# Patient Record
Sex: FEMALE | Employment: FULL TIME | URBAN - METROPOLITAN AREA
[De-identification: names, ages, dates, MRNs, and addresses within clinical notes are randomized per-mention and may not be internally consistent; named-entity substitution may affect disease eponyms.]

---

## 2022-02-08 ENCOUNTER — OFFICE VISIT (OUTPATIENT)
Dept: OBGYN CLINIC | Age: 32
End: 2022-02-08

## 2022-02-08 VITALS
HEIGHT: 63 IN | SYSTOLIC BLOOD PRESSURE: 106 MMHG | BODY MASS INDEX: 25.34 KG/M2 | WEIGHT: 143 LBS | DIASTOLIC BLOOD PRESSURE: 60 MMHG

## 2022-02-08 VITALS — BODY MASS INDEX: 25.37 KG/M2 | HEIGHT: 63 IN | WEIGHT: 143.2 LBS

## 2022-02-08 DIAGNOSIS — Z12.4 ENCOUNTER FOR PAPANICOLAOU SMEAR FOR CERVICAL CANCER SCREENING: ICD-10-CM

## 2022-02-08 DIAGNOSIS — Z01.419 ENCOUNTER FOR WELL WOMAN EXAM WITH ROUTINE GYNECOLOGICAL EXAM: ICD-10-CM

## 2022-02-08 DIAGNOSIS — Z01.419 ENCOUNTER FOR WELL WOMAN EXAM WITH ROUTINE GYNECOLOGICAL EXAM: Primary | ICD-10-CM

## 2022-02-08 DIAGNOSIS — Z12.4 ENCOUNTER FOR PAPANICOLAOU SMEAR FOR CERVICAL CANCER SCREENING: Primary | ICD-10-CM

## 2022-02-08 PROCEDURE — 99385 PREV VISIT NEW AGE 18-39: CPT | Performed by: OBSTETRICS & GYNECOLOGY

## 2022-02-08 RX ORDER — NORETHINDRONE ACETATE AND ETHINYL ESTRADIOL AND FERROUS FUMARATE 1MG-20(24)
KIT ORAL
COMMUNITY
Start: 2022-01-06 | End: 2022-02-08 | Stop reason: SDUPTHER

## 2022-02-08 RX ORDER — GLUCOSAMINE/CHONDR SU A SOD 750-600 MG
TABLET ORAL
COMMUNITY

## 2022-02-08 RX ORDER — PHENAZOPYRIDINE HYDROCHLORIDE 95 MG/1
95 TABLET ORAL
COMMUNITY

## 2022-02-08 RX ORDER — TITANIUM DIOXIDE, OCTINOXATE, ZINC OXIDE 4.61; 1.6; .78 G/40ML; G/40ML; G/40ML
CREAM TOPICAL
COMMUNITY

## 2022-02-08 RX ORDER — MULTIVITAMIN
CAPSULE ORAL
COMMUNITY

## 2022-02-08 RX ORDER — NORETHINDRONE ACETATE AND ETHINYL ESTRADIOL AND FERROUS FUMARATE 1MG-20(24)
1 KIT ORAL DAILY
Qty: 3 DOSE PACK | Refills: 4 | Status: SHIPPED | OUTPATIENT
Start: 2022-02-08

## 2022-02-08 NOTE — PROGRESS NOTES
Annual exam    Paul Mahmood is a 28 y.o. presenting for annual exam. Her main concerns today include  She would like to establish care here, she just moved to [place]. She would like a refill on OCP, she would like to discuss family planning, she is planning to conceive within the next few years, she is getting  in 2023. She would like to conceive as as possible after marriage. 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 Ob/Gyn Hx:  G P A -   LMP- 23  Menarche- 13  Menses- q month, normal  Contraception- OCP  STI- none  ? SA- yes    Health maintenance:  Pap- ~ WNL per pt  Mammo- none d/t age  Colonoscopy- none  Gardasil- 3/3    History reviewed. No pertinent past medical history. History reviewed. No pertinent surgical history. Family History   Problem Relation Age of Onset    Breast Cancer Maternal Grandmother     Colon Cancer Maternal Grandfather     No Known Problems Paternal Grandfather        Social History     Socioeconomic History    Marital status: SINGLE     Spouse name: Not on file    Number of children: Not on file    Years of education: Not on file    Highest education level: Not on file   Occupational History    Not on file   Tobacco Use    Smoking status: Never Smoker    Smokeless tobacco: Never Used   Vaping Use    Vaping Use: Never used   Substance and Sexual Activity    Alcohol use:  Yes     Alcohol/week: 5.0 standard drinks     Types: 5 Glasses of wine per week    Drug use: Never    Sexual activity: Yes     Partners: Male     Birth control/protection: Pill     Comment: claude  fe   Other Topics Concern    Not on file   Social History Narrative    Not on file     Social Determinants of Health     Financial Resource Strain:     Difficulty of Paying Living Expenses: Not on file   Food Insecurity:     Worried About Running Out of Food in the Last Year: Not on file    [address] in the Last Year: Not on file   Transportation Needs:     Lack of Transportation (Medical): Not on file    Lack of Transportation (Non-Medical):  Not on file   Physical Activity:     Days of Exercise per Week: Not on file    Minutes of Exercise per Session: Not on file   Stress:     Feeling of Stress : Not on file   Social Connections:     Frequency of Communication with Friends and Family: Not on file    Frequency of Social Gatherings with Friends and Family: Not on file    Attends Mandaeism Services: Not on file    Active Member of [address] [place] [address] [employer] or Organizations: Not on file    Attends Club or Organization Meetings: Not on file    Marital Status: Not on file   Intimate Partner Violence:     Fear of Current or Ex-Partner: Not on file    Emotionally Abused: Not on file    Physically Abused: Not on file    Sexually Abused: Not on file   Housing Stability:     Unable to Pay for Housing in the Last Year: Not on file    Number of [place] in the Last Year: Not on file    Unstable Housing in the Last Year: Not on file           Allergies   Allergen Reactions    Penicillins Hives       Review of Systems - History obtained from the patient  Constitutional: negative for weight loss, fever, night sweats  HEENT: negative for hearing loss, earache, congestion, snoring, sorethroat  CV: negative for chest pain, palpitations, edema  Resp: negative for cough, shortness of breath, wheezing  GI: negative for change in bowel habits, abdominal pain, black or bloody stools  : negative for frequency, dysuria, hematuria, vaginal discharge  MSK: negative for back pain, joint pain, muscle pain  Breast: negative for breast lumps, nipple discharge, galactorrhea  Skin :negative for itching, rash, hives  Neuro: negative for dizziness, headache, confusion, weakness  Psych: negative for anxiety, depression, change in mood  Heme/lymph: negative for bleeding, bruising, pallor    Physical Exam    Visit Vitals  Ht 5' 3\" (1.6 m)   Wt 143 lb 3.2 oz (65 kg)   LMP 02/07/2022   BMI 25.37 kg/m²       Constitutional  · Appearance: well-nourished, well developed, alert, in no acute distress    HENT  · Head and Face: appears normal    Neck  · Inspection/Palpation: normal appearance, no masses or tenderness  · Lymph Nodes: no lymphadenopathy present  · Thyroid: gland size normal, nontender, no nodules or masses present on palpation    Chest  · Respiratory Effort: non-labored breathing  · Auscultation: CTAB, normal breath sounds    Cardiovascular  · Heart:  · Auscultation: regular rate and rhythm without murmur  · Extremities: no peripheral edema    Breasts  · Inspection of Breasts: breasts symmetrical, no skin changes, no discharge present, nipple appearance normal, no skin retraction present  · Palpation of Breasts and Axillae: no masses present on palpation, no breast tenderness  · Axillary Lymph Nodes: no lymphadenopathy present    Gastrointestinal  · Abdominal Examination: abdomen non-tender to palpation, normal bowel sounds, no masses present  · Liver and spleen: no hepatomegaly present, spleen not palpable  · Hernias: no hernias identified    Genitourinary  · External Genitalia: normal appearance for age, no discharge present, no tenderness present, no inflammatory lesions present, no masses present, no atrophy present  · Vagina: normal vaginal vault without central or paravaginal defects, no discharge present, no inflammatory lesions present, no masses present, menstrual blood present  · Bladder: non-tender to palpation  · Urethra: appears normal  · Cervix: normal   · Uterus: normal size, shape and consistency  · Adnexa: no adnexal tenderness present, no adnexal masses present  · Perineum: perineum within normal limits, no evidence of trauma, no rashes or skin lesions present    Skin  · General Inspection: no rash, no lesions identified    Neurologic/Psychiatric  · Mental Status:  · Orientation: grossly oriented to person, place and time  · Mood and Affect: mood normal, affect appropriate      Assessment/Plan:  31 y. o.presenting for annual exam. Overall doing well. Normal gynecologic and breast exams. Healthy habits and lifestyle reviewed. Pap with HPV performed today. Patient declines STD screening. Contraception and menstrual regulation - patient opts for cont OCPs for now. Rx sent. Discussed timing of stopping her OCPs 3-4 months before planned conception and starting a prenatal vitamin around the same time.      Ana Alva MD

## 2022-02-08 NOTE — PATIENT INSTRUCTIONS
Well Visit, Ages 25 to 48: Care Instructions  Overview     Well visits can help you stay healthy. Your doctor has checked your overall health and may have suggested ways to take good care of yourself. Your doctor also may have recommended tests. At home, you can help prevent illness with healthy eating, regular exercise, and other steps. Follow-up care is a key part of your treatment and safety. Be sure to make and go to all appointments, and call your doctor if you are having problems. It's also a good idea to know your test results and keep a list of the medicines you take. How can you care for yourself at home? · Get screening tests that you and your doctor decide on. Screening helps find diseases before any symptoms appear. · Eat healthy foods. Choose fruits, vegetables, whole grains, protein, and low-fat dairy foods. Limit fat, especially saturated fat. Reduce salt in your diet. · Limit alcohol. If you are a man, have no more than 2 drinks a day or 14 drinks a week. If you are a woman, have no more than 1 drink a day or 7 drinks a week. · Get at least 30 minutes of physical activity on most days of the week. Walking is a good choice. You also may want to do other activities, such as running, swimming, cycling, or playing tennis or team sports. Discuss any changes in your exercise program with your doctor. · Reach and stay at a healthy weight. This will lower your risk for many problems, such as obesity, diabetes, heart disease, and high blood pressure. · Do not smoke or allow others to smoke around you. If you need help quitting, talk to your doctor about stop-smoking programs and medicines. These can increase your chances of quitting for good. · Care for your mental health. It is easy to get weighed down by worry and stress. Learn strategies to manage stress, like deep breathing and mindfulness, and stay connected with your family and community.  If you find you often feel sad or hopeless, talk with your doctor. Treatment can help. · Talk to your doctor about whether you have any risk factors for sexually transmitted infections (STIs). You can help prevent STIs if you wait to have sex with a new partner (or partners) until you've each been tested for STIs. It also helps if you use condoms (male or female condoms) and if you limit your sex partners to one person who only has sex with you. Vaccines are available for some STIs, such as HPV. · Use birth control if it's important to you to prevent pregnancy. Talk with your doctor about the choices available and what might be best for you. · If you think you may have a problem with alcohol or drug use, talk to your doctor. This includes prescription medicines (such as amphetamines and opioids) and illegal drugs (such as cocaine and methamphetamine). Your doctor can help you figure out what type of treatment is best for you. · Protect your skin from too much sun. When you're outdoors from 10 a.m. to 4 p.m., stay in the shade or cover up with clothing and a hat with a wide brim. Wear sunglasses that block UV rays. Even when it's cloudy, put broad-spectrum sunscreen (SPF 30 or higher) on any exposed skin. · See a dentist one or two times a year for checkups and to have your teeth cleaned. · Wear a seat belt in the car. When should you call for help? Watch closely for changes in your health, and be sure to contact your doctor if you have any problems or symptoms that concern you. Where can you learn more? Go to http://abby-colten.info/  Enter P072 in the search box to learn more about \"Well Visit, Ages 25 to 48: Care Instructions. \"  Current as of: February 11, 2021               Content Version: 13.0  © 4409-2802 Healthwise, Incorporated. Care instructions adapted under license by [employer] (which disclaims liability or warranty for this information).  If you have questions about a medical condition or this instruction, always ask your healthcare professional. [place] [address] any warranty or liability for your use of this information.

## 2022-02-15 ENCOUNTER — TELEPHONE (OUTPATIENT)
Dept: OBGYN CLINIC | Age: 32
End: 2022-02-15

## 2022-02-15 LAB
CYTOLOGIST CVX/VAG CYTO: NORMAL
CYTOLOGY CVX/VAG DOC CYTO: NORMAL
CYTOLOGY CVX/VAG DOC THIN PREP: NORMAL
DX ICD CODE: NORMAL
HPV I/H RISK 4 DNA CVX QL PROBE+SIG AMP: NEGATIVE
Lab: NORMAL
Lab: NORMAL
OTHER STN SPEC: NORMAL
STAT OF ADQ CVX/VAG CYTO-IMP: NORMAL

## 2022-02-15 NOTE — TELEPHONE ENCOUNTER
Call received at 11:25am      28year old patient last seen in the office on 2/8/2022. Patient calling back about her recent lab work    Patient was advised of MD reviewed lab results and verbalized understanding.    Ezio Shaw MD   2/15/2022 10:13 AM EST         Please notify patient that her results of her pap and HPV test are normal.

## 2022-04-20 NOTE — PROGRESS NOTES
HPI: Paul Altamirano (: 1990) is a 28 y.o. female, patient, here for evaluation of the following chief complaint(s):    Hand Pain (Right hand numbness, tingling, soreness starting 6 weeks ago. H/o carpal tunnel syndrome, has received steroid injections twice, which helped for a while.)  Patient is seen today to evaluate her right wrist and hand. She has a history of right wrist carpal tunnel syndrome and has had 2 prior corticosteroid injections. Each time the injections have resolved her paresthesias but slowly with time her pain and paresthesias have recurred. She now complains of more paresthesias on the right side that extend proximally towards the shoulder but she otherwise demonstrates good range of motion without digital clicking or locking. Vitals:  Ht 5' 3\" (1.6 m)   Wt 135 lb (61.2 kg)   BMI 23.91 kg/m²    Body mass index is 23.91 kg/m². Allergies   Allergen Reactions    Penicillins Hives       Current Outpatient Medications   Medication Sig    VITAMIN B COMPLEX PO Take 1 Tablet by mouth daily.  multivitamin capsule Take  by mouth.  cranberry 400 mg cap Take  by mouth.  Biotin 2,500 mcg cap Take  by mouth.  Eva 24 Fe 1 mg-20 mcg(24) /75 mg (4) chew Take 1 Tablet by mouth daily.  phenazopyridine (PYRIDIUM) 95 mg tab Take 95 mg by mouth three (3) times daily as needed. (Patient not taking: Reported on 2022)     No current facility-administered medications for this visit. History reviewed. No pertinent past medical history. History reviewed. No pertinent surgical history. Family History   Problem Relation Age of Onset    Breast Cancer Maternal Grandmother     Colon Cancer Maternal Grandfather     No Known Problems Paternal Grandfather         Social History     Tobacco Use    Smoking status: Never Smoker    Smokeless tobacco: Never Used   Vaping Use    Vaping Use: Never used   Substance Use Topics    Alcohol use:  Yes     Alcohol/week: 5.0 standard drinks     Types: 5 Glasses of wine per week    Drug use: Never        Review of Systems   All other systems reviewed and are negative. ROS     Positive for: Musculoskeletal    Last edited by Bia Thapa on 4/21/2022  9:16 AM. (History)             Physical Exam    Both elbows, forearms, wrist and hands demonstrate good range of motion with no digital locking. She has a positive Tinel's more than Phalen's and nerve compression test on the right side producing paresthesias in the median nerve distribution. She has good intrinsic and thenar muscle strength. No cyst or mass. Imaging:    XR Results (most recent):  No results found for this or any previous visit. ASSESSMENT/PLAN:  Below is the assessment and plan developed based on review of pertinent history, physical exam, labs, studies, and medications. Patient examination has remained consistent with right wrist carpal tunnel syndrome. She has had 2 prior successful carpal tunnel injections that of abated her symptoms but slowly with time her paresthesias have returned. She was offered but deferred 1/3 injection and prefers instead to proceed with a right wrist endoscopic carpal tunnel release. I reviewed risks that include but not limited to stiffness, pain, nerve or tendon damage and incomplete relief of pain and paresthesias. Arrangements can be made for this to be performed on an outpatient basis soon as possible. 1. Right hand pain  2. Carpal tunnel syndrome of right wrist      Return for Follow-up 7 to 10 days after surgery. .    An electronic signature was used to authenticate this note.   -- Renata Shaikh MD

## 2022-04-21 ENCOUNTER — OFFICE VISIT (OUTPATIENT)
Dept: ORTHOPEDIC SURGERY | Age: 32
End: 2022-04-21
Payer: COMMERCIAL

## 2022-04-21 VITALS — HEIGHT: 63 IN | WEIGHT: 135 LBS | BODY MASS INDEX: 23.92 KG/M2

## 2022-04-21 DIAGNOSIS — M79.641 RIGHT HAND PAIN: Primary | ICD-10-CM

## 2022-04-21 DIAGNOSIS — G56.01 CARPAL TUNNEL SYNDROME OF RIGHT WRIST: ICD-10-CM

## 2022-04-21 DIAGNOSIS — G56.01 CARPAL TUNNEL SYNDROME OF RIGHT WRIST: Primary | ICD-10-CM

## 2022-04-21 PROCEDURE — 99213 OFFICE O/P EST LOW 20 MIN: CPT | Performed by: ORTHOPAEDIC SURGERY

## 2022-05-25 DIAGNOSIS — G56.01 CARPAL TUNNEL SYNDROME OF RIGHT WRIST: Primary | ICD-10-CM

## 2022-05-25 RX ORDER — HYDROCODONE BITARTRATE AND ACETAMINOPHEN 5; 325 MG/1; MG/1
1 TABLET ORAL
Qty: 15 TABLET | Refills: 0 | Status: SHIPPED | OUTPATIENT
Start: 2022-05-25 | End: 2022-05-28

## 2022-06-01 NOTE — PROGRESS NOTES
HPI: Paul Altamirano (: 1990) is a 28 y.o. female, patient, here for evaluation of the following chief complaint(s):    No chief complaint on file. Patient is seen today to evaluate her right wrist and hand. She has a history of right wrist carpal tunnel syndrome and has had 2 prior corticosteroid injections. Each time the injections have resolved her paresthesias but slowly with time her pain and paresthesias have recurred. She now complains of more paresthesias on the right side that extend proximally towards the shoulder but she otherwise demonstrates good range of motion without digital clicking or locking. She underwent a right endoscopic carpal tunnel release on 2022. Vitals: There were no vitals taken for this visit. There is no height or weight on file to calculate BMI. Allergies   Allergen Reactions    Penicillins Hives       Current Outpatient Medications   Medication Sig    VITAMIN B COMPLEX PO Take 1 Tablet by mouth daily.  multivitamin capsule Take  by mouth.  phenazopyridine (PYRIDIUM) 95 mg tab Take 95 mg by mouth three (3) times daily as needed. (Patient not taking: Reported on 2022)    cranberry 400 mg cap Take  by mouth.  Biotin 2,500 mcg cap Take  by mouth.  Eva 24 Fe 1 mg-20 mcg(24) /75 mg (4) chew Take 1 Tablet by mouth daily. No current facility-administered medications for this visit. No past medical history on file. No past surgical history on file. Family History   Problem Relation Age of Onset    Breast Cancer Maternal Grandmother     Colon Cancer Maternal Grandfather     No Known Problems Paternal Grandfather         Social History     Tobacco Use    Smoking status: Never Smoker    Smokeless tobacco: Never Used   Vaping Use    Vaping Use: Never used   Substance Use Topics    Alcohol use:  Yes     Alcohol/week: 5.0 standard drinks     Types: 5 Glasses of wine per week    Drug use: Never        Review of Systems   All other systems reviewed and are negative. Physical Exam    Right carpal tunnel wound is healing well. No redness drainage or sign of infection. Sutures are self absorbing. He has some resolving ecchymosis but no sign at all of any abnormality or hematoma. Imaging:    XR Results (most recent):  No results found for this or any previous visit. ASSESSMENT/PLAN:  Below is the assessment and plan developed based on review of pertinent history, physical exam, labs, studies, and medications. Patient examination has remained consistent with right wrist carpal tunnel syndrome. She has had 2 prior successful carpal tunnel injections that of abated her symptoms but slowly with time her paresthesias have returned. She was offered but deferred 1/3 injection and prefers instead to proceed with a right wrist endoscopic carpal tunnel release. She indeed underwent a right endoscopic carpal tunnel release on 5/26/2022. Continue with simple wound care, gentle motion and strength. No current concerns with left wrist.  Follow-up in 3 to 4 weeks. 1. Carpal tunnel syndrome of right wrist  2. Right hand pain  3. Status post carpal tunnel release      No follow-ups on file. An electronic signature was used to authenticate this note.   -- Kirti Catrer MD

## 2022-06-02 ENCOUNTER — OFFICE VISIT (OUTPATIENT)
Dept: ORTHOPEDIC SURGERY | Age: 32
End: 2022-06-02
Payer: COMMERCIAL

## 2022-06-02 DIAGNOSIS — M79.641 RIGHT HAND PAIN: ICD-10-CM

## 2022-06-02 DIAGNOSIS — Z98.890 STATUS POST CARPAL TUNNEL RELEASE: ICD-10-CM

## 2022-06-02 DIAGNOSIS — G56.01 CARPAL TUNNEL SYNDROME OF RIGHT WRIST: Primary | ICD-10-CM

## 2022-06-02 PROCEDURE — 99024 POSTOP FOLLOW-UP VISIT: CPT | Performed by: ORTHOPAEDIC SURGERY

## 2022-06-02 NOTE — PATIENT INSTRUCTIONS
Wrist: Exercises  Introduction  Here are some examples of exercises for you to try. The exercises may be suggested for a condition or for rehabilitation. Start each exercise slowly. Ease off the exercises if you start to have pain. You will be told when to start these exercises and which ones will work best for you. How to do the exercises  Prayer stretch    1. Start with your palms together in front of your chest just below your chin. 2. Slowly lower your hands toward your waistline, keeping your hands close to your stomach and your palms together until you feel a mild to moderate stretch under your forearms. 3. Hold for at least 15 to 30 seconds. Repeat 2 to 4 times. Wrist flexor stretch    1. Extend your arm in front of you with your palm up. 2. Bend your wrist, pointing your hand toward the floor. 3. With your other hand, gently bend your wrist farther until you feel a mild to moderate stretch in your forearm. 4. Hold for at least 15 to 30 seconds. Repeat 2 to 4 times. Wrist extensor stretch    1. Repeat steps 1 through 4 of the stretch above, but begin with your extended hand palm down. Follow-up care is a key part of your treatment and safety. Be sure to make and go to all appointments, and call your doctor if you are having problems. It's also a good idea to know your test results and keep a list of the medicines you take. Where can you learn more? Go to http://www.gray.com/  Enter Z598 in the search box to learn more about \"Wrist: Exercises. \"  Current as of: July 1, 2021               Content Version: 13.2  © 2006-2022 Healthwise, Incorporated. Care instructions adapted under license by [employer] (which disclaims liability or warranty for this information).  If you have questions about a medical condition or this instruction, always ask your healthcare professional. [place] [address] any warranty or liability for your use of this information.

## 2022-06-28 NOTE — PROGRESS NOTES
HPI: Paul Altamirano (: 1990) is a 28 y.o. female, patient, here for evaluation of the following chief complaint(s):    No chief complaint on file. Patient is seen today to evaluate her right wrist and hand. She has a history of right wrist carpal tunnel syndrome and has had 2 prior corticosteroid injections. Each time the injections have resolved her paresthesias but slowly with time her pain and paresthesias have recurred. She now complains of more paresthesias on the right side that extend proximally towards the shoulder but she otherwise demonstrates good range of motion without digital clicking or locking. She underwent a right endoscopic carpal tunnel release on 2022. Vitals: There were no vitals taken for this visit. There is no height or weight on file to calculate BMI. Allergies   Allergen Reactions    Penicillins Hives       Current Outpatient Medications   Medication Sig    VITAMIN B COMPLEX PO Take 1 Tablet by mouth daily.  multivitamin capsule Take  by mouth.  phenazopyridine (PYRIDIUM) 95 mg tab Take 95 mg by mouth three (3) times daily as needed. (Patient not taking: Reported on 2022)    cranberry 400 mg cap Take  by mouth.  Biotin 2,500 mcg cap Take  by mouth.  Eva 24 Fe 1 mg-20 mcg(24) /75 mg (4) chew Take 1 Tablet by mouth daily. No current facility-administered medications for this visit. No past medical history on file. No past surgical history on file. Family History   Problem Relation Age of Onset    Breast Cancer Maternal Grandmother     Colon Cancer Maternal Grandfather     No Known Problems Paternal Grandfather         Social History     Tobacco Use    Smoking status: Never Smoker    Smokeless tobacco: Never Used   Vaping Use    Vaping Use: Never used   Substance Use Topics    Alcohol use:  Yes     Alcohol/week: 5.0 standard drinks     Types: 5 Glasses of wine per week    Drug use: Never        Review of Systems   All other systems reviewed and are negative. Physical Exam    Right carpal tunnel wound is well-healed. No redness drainage or sign of infection. Prior ecchymosis is fully resolved. She does have slight soreness more to the base of the thenar muscle than the hypothenar side but does not have true pillar pain. Imaging:    XR Results (most recent):  No results found for this or any previous visit. ASSESSMENT/PLAN:  Below is the assessment and plan developed based on review of pertinent history, physical exam, labs, studies, and medications. Patient examination has remained consistent with right wrist carpal tunnel syndrome. She has had 2 prior successful carpal tunnel injections that of abated her symptoms but slowly with time her paresthesias have returned. She was offered but deferred 1/3 injection and prefers instead to proceed with a right wrist endoscopic carpal tunnel release. She indeed underwent a right endoscopic carpal tunnel release on 5/26/2022. Overall she is doing well but did does have some mild tenderness to the radial base of the thenar muscle but not on the hypothenar side and no real evidence of pillar pain. I recommended massage to the region with continued motion and strengthening. Her sensation is restored and she is pleased with the outcome thus far. She deferred the need for outpatient therapy and may continue with home exercises. She may return anytime for further treatment. 1. Carpal tunnel syndrome of right wrist  2. Right hand pain  3. Status post carpal tunnel release      No follow-ups on file. An electronic signature was used to authenticate this note.   -- Genesis Castro MD

## 2022-06-30 ENCOUNTER — OFFICE VISIT (OUTPATIENT)
Dept: ORTHOPEDIC SURGERY | Age: 32
End: 2022-06-30
Payer: COMMERCIAL

## 2022-06-30 DIAGNOSIS — G56.01 CARPAL TUNNEL SYNDROME OF RIGHT WRIST: Primary | ICD-10-CM

## 2022-06-30 DIAGNOSIS — Z98.890 STATUS POST CARPAL TUNNEL RELEASE: ICD-10-CM

## 2022-06-30 DIAGNOSIS — M79.641 RIGHT HAND PAIN: ICD-10-CM

## 2022-06-30 PROCEDURE — 99024 POSTOP FOLLOW-UP VISIT: CPT | Performed by: ORTHOPAEDIC SURGERY

## 2022-06-30 NOTE — PATIENT INSTRUCTIONS
Wrist: Exercises  Introduction  Here are some examples of exercises for you to try. The exercises may be suggested for a condition or for rehabilitation. Start each exercise slowly. Ease off the exercises if you start to have pain. You will be told when to start these exercises and which ones will work best for you. How to do the exercises  Prayer stretch    1. Start with your palms together in front of your chest just below your chin. 2. Slowly lower your hands toward your waistline, keeping your hands close to your stomach and your palms together until you feel a mild to moderate stretch under your forearms. 3. Hold for at least 15 to 30 seconds. Repeat 2 to 4 times. Wrist flexor stretch    1. Extend your arm in front of you with your palm up. 2. Bend your wrist, pointing your hand toward the floor. 3. With your other hand, gently bend your wrist farther until you feel a mild to moderate stretch in your forearm. 4. Hold for at least 15 to 30 seconds. Repeat 2 to 4 times. Wrist extensor stretch    1. Repeat steps 1 through 4 of the stretch above, but begin with your extended hand palm down. Follow-up care is a key part of your treatment and safety. Be sure to make and go to all appointments, and call your doctor if you are having problems. It's also a good idea to know your test results and keep a list of the medicines you take. Where can you learn more? Go to http://www.gray.com/  Enter Z598 in the search box to learn more about \"Wrist: Exercises. \"  Current as of: July 1, 2021               Content Version: 13.2  © 2006-2022 Healthwise, Incorporated. Care instructions adapted under license by [employer] (which disclaims liability or warranty for this information).  If you have questions about a medical condition or this instruction, always ask your healthcare professional. [place] [address] any warranty or liability for your use of this information.

## 2023-01-04 ENCOUNTER — OFFICE VISIT (OUTPATIENT)
Dept: INTERNAL MEDICINE CLINIC | Age: 33
End: 2023-01-04
Payer: COMMERCIAL

## 2023-01-04 VITALS
HEART RATE: 71 BPM | DIASTOLIC BLOOD PRESSURE: 66 MMHG | OXYGEN SATURATION: 100 % | SYSTOLIC BLOOD PRESSURE: 99 MMHG | RESPIRATION RATE: 16 BRPM | HEIGHT: 63 IN | BODY MASS INDEX: 23.12 KG/M2 | WEIGHT: 130.5 LBS | TEMPERATURE: 98.9 F

## 2023-01-04 DIAGNOSIS — Z11.59 NEED FOR HEPATITIS C SCREENING TEST: ICD-10-CM

## 2023-01-04 DIAGNOSIS — Z00.00 WELL WOMAN EXAM (NO GYNECOLOGICAL EXAM): Primary | ICD-10-CM

## 2023-01-04 DIAGNOSIS — Z23 ENCOUNTER FOR IMMUNIZATION: ICD-10-CM

## 2023-01-04 PROCEDURE — 99385 PREV VISIT NEW AGE 18-39: CPT | Performed by: FAMILY MEDICINE

## 2023-01-04 PROCEDURE — 90686 IIV4 VACC NO PRSV 0.5 ML IM: CPT | Performed by: FAMILY MEDICINE

## 2023-01-04 PROCEDURE — 90471 IMMUNIZATION ADMIN: CPT | Performed by: FAMILY MEDICINE

## 2023-01-04 NOTE — PROGRESS NOTES
Chief Complaint   Patient presents with    Complete Physical     she is a 33y.o. year old female who presents for CPE  Complete Physical Exam Questions:    LMP -  12/21/2022  Last Pap (q 3 years, or q5 with HPV) - 2/22  Last Mammogram (50-74 biennially)- n/a  Hx of abnl Pap - No    1. Do you follow a low fat diet?  no  2. Are you up to date on your Tdap (<10 years)? Yes  3. Have you ever had a Pneumovax vaccine (>65)? Not applicable   ZZZ24 Not applicable   YIFV42 Not applicable  4. Have you had Zoster vaccine (>60)? Not applicable  5. Have you had the HPV - Gardasil (13- 26)? Not applicable  6. Do you follow an exercise program?  yes  7. Do you smoke?  no  8. Do you consider yourself overweight?  no  9. Is there a family history of CAD< age 48? No  10. Is there a family history of Cancer?  yes  11. Do you know your Cancer risks? No  12. Have you had a colonoscopy?  no  13. Have you been tested for HIV or other STI's? No HIV testing today(18-66 y/o)? No  14. Have had a bone density scan or DEXA done(>65)? No  15. Have you had an EKG performed in the last five years (>50)? No    Other complaints:none    Reviewed and agree with Nurse Note and duplicated in this note. Reviewed PmHx, RxHx, FmHx, SocHx, AllgHx and updated and dated in the chart. Family History   Problem Relation Age of Onset    Breast Cancer Maternal Grandmother     Colon Cancer Maternal Grandfather     No Known Problems Paternal Grandfather      No past medical history on file. Social History     Socioeconomic History    Marital status: SINGLE   Tobacco Use    Smoking status: Never    Smokeless tobacco: Never   Vaping Use    Vaping Use: Never used   Substance and Sexual Activity    Alcohol use:  Yes     Alcohol/week: 5.0 standard drinks     Types: 5 Glasses of wine per week    Drug use: Never    Sexual activity: Yes     Partners: Male     Birth control/protection: Pill     Comment: claude 24 fe        Review of Systems - negative except as listed above      Objective:     Vitals:    01/04/23 1330   BP: 99/66   Pulse: 71   Resp: 16   Temp: 98.9 °F (37.2 °C)   SpO2: 100%   Weight: 130 lb 8 oz (59.2 kg)   Height: 5' 3\" (1.6 m)       Physical Examination: General appearance - alert, well appearing, and in no distress  Eyes - pupils equal and reactive, extraocular eye movements intact  Ears - bilateral TM's and external ear canals normal  Nose - normal and patent, no erythema, discharge or polyps  Mouth - mucous membranes moist, pharynx normal without lesions  Neck - supple, no significant adenopathy  Chest - clear to auscultation, no wheezes, rales or rhonchi, symmetric air entry  Heart - normal rate, regular rhythm, normal S1, S2, no murmurs, rubs, clicks or gallops  Abdomen - soft, nontender, nondistended, no masses or organomegaly  Back exam - full range of motion, no tenderness, palpable spasm or pain on motion  Neurological - alert, oriented, normal speech, no focal findings or movement disorder noted  Musculoskeletal - no joint tenderness, deformity or swelling  Extremities - peripheral pulses normal, no pedal edema, no clubbing or cyanosis  Skin - normal coloration and turgor, no rashes, no suspicious skin lesions noted      Assessment/ Plan:   Diagnoses and all orders for this visit:    1. Well woman exam (no gynecological exam)  -     CBC W/O DIFF; Future  -     LIPID PANEL; Future  -     METABOLIC PANEL, COMPREHENSIVE; Future    2. Need for hepatitis C screening test  -     HEPATITIS C AB; Future         Labs to be drawn: CBC, CMP, Lipid            I have discussed the diagnosis with the patient and the intended plan as seen in the above orders. The patient has received an after-visit summary and questions were answered concerning future plans.    Medication Side Effects and Warnings were discussed with patient,  Patient Labs were reviewed and or requested, and  Patient Past Records were reviewed and or requested  yes Pt agrees to call or return to clinic and/or go to closest ER with any worsening of symptoms. This may include, but not limited to increased fever (>100.4) with NSAIDS or Tylenol, increased edema, confusion, rash, worsening of presenting symptoms. Please note that this dictation was completed with [employer], the computer voice recognition software. Quite often unanticipated grammatical, syntax, homophones, and other interpretive errors are inadvertently transcribed by the computer software. Please disregard these errors. Please excuse any errors that have escaped final proofreading. Thank you.

## 2023-01-05 LAB
ALBUMIN SERPL-MCNC: 4.3 G/DL (ref 3.8–4.8)
ALBUMIN/GLOB SERPL: 1.5 {RATIO} (ref 1.2–2.2)
ALP SERPL-CCNC: 39 IU/L (ref 44–121)
ALT SERPL-CCNC: 11 IU/L (ref 0–32)
AST SERPL-CCNC: 15 IU/L (ref 0–40)
BILIRUB SERPL-MCNC: 0.5 MG/DL (ref 0–1.2)
BUN SERPL-MCNC: 15 MG/DL (ref 6–20)
BUN/CREAT SERPL: 25 (ref 9–23)
CALCIUM SERPL-MCNC: 8.9 MG/DL (ref 8.7–10.2)
CHLORIDE SERPL-SCNC: 104 MMOL/L (ref 96–106)
CHOLEST SERPL-MCNC: 209 MG/DL (ref 100–199)
CO2 SERPL-SCNC: 22 MMOL/L (ref 20–29)
CREAT SERPL-MCNC: 0.61 MG/DL (ref 0.57–1)
EGFR: 122 ML/MIN/1.73
ERYTHROCYTE [DISTWIDTH] IN BLOOD BY AUTOMATED COUNT: 12 % (ref 11.7–15.4)
GLOBULIN SER CALC-MCNC: 2.8 G/DL (ref 1.5–4.5)
GLUCOSE SERPL-MCNC: 82 MG/DL (ref 70–99)
HCT VFR BLD AUTO: 38.7 % (ref 34–46.6)
HCV AB S/CO SERPL IA: <0.1 S/CO RATIO (ref 0–0.9)
HDLC SERPL-MCNC: 69 MG/DL
HGB BLD-MCNC: 12.7 G/DL (ref 11.1–15.9)
LDLC SERPL CALC-MCNC: 124 MG/DL (ref 0–99)
MCH RBC QN AUTO: 30.5 PG (ref 26.6–33)
MCHC RBC AUTO-ENTMCNC: 32.8 G/DL (ref 31.5–35.7)
MCV RBC AUTO: 93 FL (ref 79–97)
PLATELET # BLD AUTO: 323 X10E3/UL (ref 150–450)
POTASSIUM SERPL-SCNC: 4.6 MMOL/L (ref 3.5–5.2)
PROT SERPL-MCNC: 7.1 G/DL (ref 6–8.5)
RBC # BLD AUTO: 4.16 X10E6/UL (ref 3.77–5.28)
SODIUM SERPL-SCNC: 139 MMOL/L (ref 134–144)
TRIGL SERPL-MCNC: 89 MG/DL (ref 0–149)
VLDLC SERPL CALC-MCNC: 16 MG/DL (ref 5–40)
WBC # BLD AUTO: 8.8 X10E3/UL (ref 3.4–10.8)

## 2023-01-05 NOTE — PROGRESS NOTES
Your \"Bad\" cholesterol (LDL and/or triglycerides) are elevated. Please eat a healthier diet as described below. In particular avoid fried, fatty and junk foods, while increasing fiber (fruits and vegetables). If you cannot increase fiber through diet, you can supplement with metamucil as directed on bottle daily. Also, make sure you are taking 1 to 2 grams of over the counter fish oil. Increase exercise to 5 times per week of cardio lasting at least 30 min's each (biking, walking, elliptical, swimming). Lets recheck the fasting (atleast eight hours) in 6 months. Mediterranean diet: Choose this heart-healthy diet option  The Mediterranean diet is a heart-healthy eating plan combining elements of Mediterranean-style cooking. Here's how to adopt the Mediterranean diet. If you're looking for a heart-healthy eating plan, the Mediterranean diet might be right for you. The Mediterranean diet incorporates the basics of healthy eating - plus a splash of flavorful olive oil and perhaps a glass of red wine - among other components characterizing the traditional cooking style of countries bordering the [facility]. Most healthy diets include fruits, vegetables, fish and whole grains, and limit unhealthy fats. While these parts of a healthy diet remain tried-and-true, subtle variations or differences in proportions of certain foods may make a difference in your risk of heart disease. Benefits of the [address] has shown that the traditional Mediterranean diet reduces the risk of heart disease. In fact, a recent analysis of more than 1.5 million healthy adults demonstrated that following a Mediterranean diet was associated with a reduced risk of overall and cardiovascular mortality, a reduced incidence of cancer and cancer mortality, and a reduced incidence of Parkinson's and Alzheimer's diseases.    For this reason, most if not all major scientific organizations encourage healthy adults to adapt a style of eating like that of the [address] [facility] for prevention of major chronic diseases. Key components of the Mediterranean diet  The Mediterranean diet emphasizes:   Getting plenty of exercise   Eating primarily plant-based foods, such as fruits and vegetables, whole grains, legumes and nuts   Replacing butter with healthy fats such as olive oil and canola oil   Using herbs and spices instead of salt to flavor foods   Limiting red meat to no more than a few times a month   Eating fish and poultry at least twice a week   Drinking red wine in moderation (optional)   The diet also recognizes the importance of enjoying meals with family and friends. Fruits, vegetables, nuts and grains  The Mediterranean diet traditionally includes fruits, vegetables, pasta and rice. For example, residents of [facility] eat very little red meat and average nine servings a day of antioxidant-rich fruits and vegetables. The Mediterranean diet has been associated with a lower level of oxidized low-density lipoprotein (LDL) cholesterol - the \"bad\" cholesterol that's more likely to build up deposits in your arteries. Nuts are another part of a healthy Mediterranean diet. Nuts are high in fat (approximately 80 percent of their calories come from fat), but most of the fat is not saturated. Because nuts are high in calories, they should not be eaten in large amounts - generally no more than a handful a day. For the best nutrition, avoid candied or honey-roasted and heavily salted nuts. Grains in the [address] [place] [address] region are typically whole grain and usually contain very few unhealthy trans fats, and bread is an important part of the diet there. However, throughout the [address] [place] [address] region, bread is eaten plain or dipped in olive oil - not eaten with butter or margarines, which contain saturated or trans fats.

## 2023-03-09 ENCOUNTER — OFFICE VISIT (OUTPATIENT)
Dept: OBGYN CLINIC | Age: 33
End: 2023-03-09
Payer: COMMERCIAL

## 2023-03-09 VITALS
DIASTOLIC BLOOD PRESSURE: 58 MMHG | SYSTOLIC BLOOD PRESSURE: 88 MMHG | BODY MASS INDEX: 22.15 KG/M2 | HEIGHT: 63 IN | WEIGHT: 125 LBS

## 2023-03-09 DIAGNOSIS — Z01.419 ENCOUNTER FOR WELL WOMAN EXAM WITH ROUTINE GYNECOLOGICAL EXAM: Primary | ICD-10-CM

## 2023-03-09 PROCEDURE — 99395 PREV VISIT EST AGE 18-39: CPT | Performed by: OBSTETRICS & GYNECOLOGY

## 2023-03-09 RX ORDER — ADAPALENE AND BENZOYL PEROXIDE 3; 25 MG/G; MG/G
GEL TOPICAL
COMMUNITY
Start: 2023-01-25

## 2023-03-09 RX ORDER — NORETHINDRONE ACETATE, ETHINYL ESTRADIOL AND FERROUS FUMARATE 1MG-20(24)
1 KIT ORAL DAILY
Qty: 3 DOSE PACK | Refills: 3 | Status: SHIPPED | OUTPATIENT
Start: 2023-03-09

## 2023-03-09 NOTE — PROGRESS NOTES
Annual exam    Paul Garza is a 28 y.o. presenting for annual exam. Her main concerns today include wellness screening. She would also like to discuss family planning, she is planning to conceive within the next few months, she is getting  in April! Wedding in [place], 2 week honeymoon [place]! She would like to conceive shortly after marriage. 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 Ob/Gyn Hx:  G0  LMP-   Menses- regular, light and 3 days  Contraception- OCP  STI- accepts pelvic screening  SA- yes, fiance    Health maintenance:  Pap- 2/8/22 NIL, HPV neg  Gardasil- 3/3    History reviewed. No pertinent past medical history. History reviewed. No pertinent surgical history. Family History   Problem Relation Age of Onset    Breast Cancer Maternal Grandmother     Colon Cancer Maternal Grandfather     No Known Problems Paternal Grandfather        Social History     Socioeconomic History    Marital status: SINGLE     Spouse name: Not on file    Number of children: Not on file    Years of education: Not on file    Highest education level: Not on file   Occupational History    Not on file   Tobacco Use    Smoking status: Never Smoker    Smokeless tobacco: Never Used   Vaping Use    Vaping Use: Never used   Substance and Sexual Activity    Alcohol use: Yes     Alcohol/week: 5.0 standard drinks     Types: 5 Glasses of wine per week    Drug use: Never    Sexual activity: Yes     Partners: Male     Birth control/protection: Pill     Comment: claude 24 fe   Other Topics Concern    Not on file   Social History Narrative    Not on file     Social Determinants of Health     Financial Resource Strain:     Difficulty of Paying Living Expenses: Not on file   Food Insecurity:     Worried About [address] in the Last Year: Not on file    [place] of Food in the Last Year: Not on file   Transportation Needs:     Lack of Transportation (Medical):  Not on file    Lack of Transportation (Non-Medical):  Not on file   Physical Activity:     Days of Exercise per Week: Not on file    Minutes of Exercise per Session: Not on file   Stress:     Feeling of Stress : Not on file   Social Connections:     Frequency of Communication with Friends and Family: Not on file    Frequency of Social Gatherings with Friends and Family: Not on file    Attends Mosque Services: Not on file    Active Member of Clubs or Organizations: Not on file    Attends Club or Organization Meetings: Not on file    Marital Status: Not on file   Intimate Partner Violence:     Fear of Current or Ex-Partner: Not on file    Emotionally Abused: Not on file    Physically Abused: Not on file    Sexually Abused: Not on file   Housing Stability:     Unable to Pay for Housing in the Last Year: Not on file    Number of [place] in the Last Year: Not on file    Unstable Housing in the Last Year: Not on file           Allergies   Allergen Reactions    Penicillins Hives       Review of Systems - History obtained from the patient  Constitutional: negative for weight loss, fever, night sweats  HEENT: negative for hearing loss, earache, congestion, snoring, sorethroat  CV: negative for chest pain, palpitations, edema  Resp: negative for cough, shortness of breath, wheezing  GI: negative for change in bowel habits, abdominal pain, black or bloody stools  : negative for frequency, dysuria, hematuria, vaginal discharge  MSK: negative for back pain, joint pain, muscle pain  Breast: negative for breast lumps, nipple discharge, galactorrhea  Skin :negative for itching, rash, hives  Neuro: negative for dizziness, headache, confusion, weakness  Psych: negative for anxiety, depression, change in mood  Heme/lymph: negative for bleeding, bruising, pallor    Physical Exam    Visit Vitals  Ht 5' 3\" (1.6 m)   Wt 143 lb 3.2 oz (65 kg)   LMP 02/07/2022   BMI 25.37 kg/m²       Constitutional  Appearance: well-nourished, well developed, alert, in no acute distress    HENT  Head and Face: appears normal    Neck  Inspection/Palpation: normal appearance, no masses or tenderness  Lymph Nodes: no lymphadenopathy present  Thyroid: gland size normal, nontender, no nodules or masses present on palpation    Chest  Respiratory Effort: non-labored breathing  Auscultation: CTAB, normal breath sounds    Cardiovascular  Heart: Auscultation: regular rate and rhythm without murmur  Extremities: no peripheral edema    Breasts  Inspection of Breasts: breasts symmetrical, no skin changes, no discharge present, nipple appearance normal, no skin retraction present  Palpation of Breasts and Axillae: no masses present on palpation, no breast tenderness  Axillary Lymph Nodes: no lymphadenopathy present    Gastrointestinal  Abdominal Examination: abdomen non-tender to palpation, normal bowel sounds, no masses present  Liver and spleen: no hepatomegaly present, spleen not palpable  Hernias: no hernias identified    Genitourinary  External Genitalia: normal appearance for age, no discharge present, no tenderness present, no inflammatory lesions present, no masses present, no atrophy present  Vagina: normal vaginal vault without central or paravaginal defects, no discharge present, no inflammatory lesions present, no masses present, menstrual blood present  Bladder: non-tender to palpation  Urethra: appears normal  Cervix: normal   Uterus: normal size, shape and consistency  Adnexa: no adnexal tenderness present, no adnexal masses present  Perineum: perineum within normal limits, no evidence of trauma, no rashes or skin lesions present    Skin  General Inspection: no rash, no lesions identified    Neurologic/Psychiatric  Mental Status:  Orientation: grossly oriented to person, place and time  Mood and Affect: mood normal, affect appropriate      Assessment/Plan:  28 y. o.presenting for annual exam. Overall doing well. Normal gynecologic and breast exams. Healthy habits and lifestyle reviewed. Pap with HPV UTD. Patient accepts pelvic STD screening. Contraception and menstrual regulation - patient opts for cont OCPs for now. Rx sent. Discussed timing of stopping her OCPs, menstrual tracking on an reji, and timed intercourse. Start PNV. Stop retinol products before conception.      Efrain Lopez MD

## 2023-03-10 LAB
C TRACH RRNA SPEC QL NAA+PROBE: NEGATIVE
N GONORRHOEA RRNA SPEC QL NAA+PROBE: NEGATIVE
T VAGINALIS RRNA SPEC QL NAA+PROBE: NEGATIVE

## 2023-05-18 ENCOUNTER — OFFICE VISIT (OUTPATIENT)
Age: 33
End: 2023-05-18
Payer: COMMERCIAL

## 2023-05-18 ENCOUNTER — TELEPHONE (OUTPATIENT)
Age: 33
End: 2023-05-18

## 2023-05-18 VITALS — DIASTOLIC BLOOD PRESSURE: 64 MMHG | SYSTOLIC BLOOD PRESSURE: 104 MMHG

## 2023-05-18 DIAGNOSIS — R30.0 DYSURIA: Primary | ICD-10-CM

## 2023-05-18 PROCEDURE — 99213 OFFICE O/P EST LOW 20 MIN: CPT | Performed by: OBSTETRICS & GYNECOLOGY

## 2023-05-18 RX ORDER — CEFPODOXIME PROXETIL 100 MG/1
TABLET, FILM COATED ORAL
COMMUNITY
Start: 2023-05-14 | End: 2023-05-18

## 2023-05-18 RX ORDER — NITROFURANTOIN 25; 75 MG/1; MG/1
100 CAPSULE ORAL 2 TIMES DAILY
Qty: 20 CAPSULE | Refills: 0 | Status: SHIPPED | OUTPATIENT
Start: 2023-05-18 | End: 2023-05-28

## 2023-05-18 NOTE — TELEPHONE ENCOUNTER
Patient calling name and  verified. Rl  Patient states she has had re current uti she has been on two different antibiotics she has had uti for two weeks. . And is requesting to be seen in office today. patient scheduled for appointment.

## 2023-05-18 NOTE — PROGRESS NOTES
Problem Visit    Dc Negron is a 33 y.o. G P A presenting for problem visit. Her main concern today is possible UTI. She reports having a UTI in late March, took [address] for this and felt that symptoms resolved. While on her honeymoon in [facility] this month, she began to have symptoms again. Saw a provider in [place] who prescribed Bactrim, but later contacted her to say that bacteria grown in her urine culture not susceptible to Bactrim. She was given a list of effective antibiotics, prescribed cefpodoxime after a virtual visit with a provider in the [address] [place] [address] [address]. She is currently taking this medication but feels her symptoms are not improving. She has copy of culture results and states it was susceptible to macrobid. Ob/Gyn Hx:  G0  LMP-   Menses- regular, light and 3 days  Contraception- OCP  STI- recent screening negative  SA- yes, fiance     Health maintenance:  Pap- 2/8/22 NIL, HPV neg  Gardasil- 3/3    No past medical history on file. Past Surgical History:   Procedure Laterality Date    CARPAL TUNNEL RELEASE Right     ORTHOPEDIC SURGERY         Family History   Problem Relation Age of Onset    Thyroid Disease Mother     No Known Problems Paternal Grandfather     Colon Cancer Maternal Grandfather     Breast Cancer Maternal Grandmother        Social History     Socioeconomic History    Marital status: Single     Spouse name: Not on file    Number of children: Not on file    Years of education: Not on file    Highest education level: Not on file   Occupational History    Not on file   Tobacco Use    Smoking status: Never    Smokeless tobacco: Never   Substance and Sexual Activity    Alcohol use:  Yes     Alcohol/week: 5.0 standard drinks    Drug use: Never    Sexual activity: Not on file     Comment: nhan 24 fe   Other Topics Concern    Not on file   Social History Narrative    Not on file     Social Determinants of Health     Financial Resource Strain: Not on file   Food Insecurity: Not on file

## 2023-05-20 LAB — BACTERIA UR CULT: NO GROWTH

## 2023-10-31 ENCOUNTER — TELEPHONE (OUTPATIENT)
Age: 33
End: 2023-10-31

## 2023-11-07 ENCOUNTER — ROUTINE PRENATAL (OUTPATIENT)
Age: 33
End: 2023-11-07

## 2023-11-07 VITALS
WEIGHT: 126 LBS | DIASTOLIC BLOOD PRESSURE: 70 MMHG | BODY MASS INDEX: 22.32 KG/M2 | HEIGHT: 63 IN | SYSTOLIC BLOOD PRESSURE: 102 MMHG

## 2023-11-07 DIAGNOSIS — Z34.90 PREGNANCY, UNSPECIFIED GESTATIONAL AGE: Primary | ICD-10-CM

## 2023-11-07 RX ORDER — ONDANSETRON 4 MG/1
4 TABLET, ORALLY DISINTEGRATING ORAL EVERY 8 HOURS PRN
Qty: 30 TABLET | Refills: 1 | Status: SHIPPED | OUTPATIENT
Start: 2023-11-07

## 2023-11-28 SDOH — ECONOMIC STABILITY: HOUSING INSECURITY
IN THE LAST 12 MONTHS, WAS THERE A TIME WHEN YOU DID NOT HAVE A STEADY PLACE TO SLEEP OR SLEPT IN A SHELTER (INCLUDING NOW)?: NO

## 2023-11-28 SDOH — ECONOMIC STABILITY: TRANSPORTATION INSECURITY
IN THE PAST 12 MONTHS, HAS LACK OF TRANSPORTATION KEPT YOU FROM MEETINGS, WORK, OR FROM GETTING THINGS NEEDED FOR DAILY LIVING?: NO

## 2023-11-28 SDOH — ECONOMIC STABILITY: FOOD INSECURITY: WITHIN THE PAST 12 MONTHS, YOU WORRIED THAT YOUR FOOD WOULD RUN OUT BEFORE YOU GOT MONEY TO BUY MORE.: NEVER TRUE

## 2023-11-28 SDOH — ECONOMIC STABILITY: FOOD INSECURITY: WITHIN THE PAST 12 MONTHS, THE FOOD YOU BOUGHT JUST DIDN'T LAST AND YOU DIDN'T HAVE MONEY TO GET MORE.: NEVER TRUE

## 2023-11-28 SDOH — ECONOMIC STABILITY: INCOME INSECURITY: HOW HARD IS IT FOR YOU TO PAY FOR THE VERY BASICS LIKE FOOD, HOUSING, MEDICAL CARE, AND HEATING?: NOT HARD AT ALL

## 2023-12-01 ENCOUNTER — ROUTINE PRENATAL (OUTPATIENT)
Age: 33
End: 2023-12-01

## 2023-12-01 VITALS — BODY MASS INDEX: 22.5 KG/M2 | WEIGHT: 127 LBS | SYSTOLIC BLOOD PRESSURE: 110 MMHG | DIASTOLIC BLOOD PRESSURE: 70 MMHG

## 2023-12-01 DIAGNOSIS — Z34.90 PREGNANCY, UNSPECIFIED GESTATIONAL AGE: Primary | ICD-10-CM

## 2023-12-01 LAB
C. TRACHOMATIS, EXTERNAL RESULT: NEGATIVE
HEP B, EXTERNAL RESULT: NEGATIVE
HIV, EXTERNAL RESULT: NEGATIVE
N. GONORRHOEAE, EXTERNAL RESULT: NEGATIVE
RUBELLA TITER, EXTERNAL RESULT: NORMAL

## 2023-12-01 PROCEDURE — 0502F SUBSEQUENT PRENATAL CARE: CPT | Performed by: OBSTETRICS & GYNECOLOGY

## 2023-12-01 NOTE — PROGRESS NOTES
Feeling better, but still nausea needing the unisom and B6. FHR by vscan today. New ob labs, urine, and NIPT with gender, and 4 panel carrier screening.

## 2023-12-02 LAB
ABO GROUP BLD: NORMAL
BLD GP AB SCN SERPL QL: NEGATIVE
ERYTHROCYTE [DISTWIDTH] IN BLOOD BY AUTOMATED COUNT: 12 % (ref 11.7–15.4)
FERRITIN SERPL-MCNC: 226 NG/ML (ref 15–150)
HBV SURFACE AG SERPL QL IA: NEGATIVE
HCT VFR BLD AUTO: 32.7 % (ref 34–46.6)
HCV IGG SERPL QL IA: NON REACTIVE
HGB BLD-MCNC: 11.2 G/DL (ref 11.1–15.9)
HIV 1+2 AB+HIV1 P24 AG SERPL QL IA: NON REACTIVE
MCH RBC QN AUTO: 31.3 PG (ref 26.6–33)
MCHC RBC AUTO-ENTMCNC: 34.3 G/DL (ref 31.5–35.7)
MCV RBC AUTO: 91 FL (ref 79–97)
PLATELET # BLD AUTO: 244 X10E3/UL (ref 150–450)
RBC # BLD AUTO: 3.58 X10E6/UL (ref 3.77–5.28)
RH BLD: POSITIVE
RUBV IGG SERPL IA-ACNC: 11.5 INDEX
VZV IGG SER IA-ACNC: 846 INDEX
WBC # BLD AUTO: 8.8 X10E3/UL (ref 3.4–10.8)

## 2023-12-04 LAB
HGB A MFR BLD ELPH: 97.1 % (ref 96.4–98.8)
HGB A2 MFR BLD ELPH: 2.9 % (ref 1.8–3.2)
HGB F MFR BLD ELPH: 0 % (ref 0–2)
HGB FRACT BLD-IMP: NORMAL
HGB S MFR BLD ELPH: 0 %
TREPONEMA PALLIDUM IGG+IGM AB [PRESENCE] IN SERUM OR PLASMA BY IMMUNOASSAY: NON REACTIVE

## 2023-12-05 LAB
BACTERIA UR CULT: NO GROWTH
C TRACH RRNA SPEC QL NAA+PROBE: NEGATIVE
N GONORRHOEA RRNA SPEC QL NAA+PROBE: NEGATIVE
T VAGINALIS RRNA SPEC QL NAA+PROBE: NEGATIVE

## 2023-12-08 ENCOUNTER — TELEPHONE (OUTPATIENT)
Age: 33
End: 2023-12-08

## 2023-12-08 NOTE — TELEPHONE ENCOUNTER
Patient called, name and  verified. Patient is calling to report that she saw her Panorama results but did not see them in her MyChart. They don't look like they have come over our interface. I have printed the results to be scanned into the patient's chart. Just an FYI.           Patient Information Patient Name: Ihsan Freeman YOB: 1990 Maternal Age at NINA: 28 Gestational Age: 15 weeks/ 4 days Maternal Weight: 127 lbs Patient ID: [id number] Medical Record #: [id number] Collection Kit: [id number] Accessioning ID: [id number] Case File ID: [id number] Test Information Ordering Physician: Sahara [facility] Information: [address] Additional Reports: N/A Report Date: 2023 Samples Collected: 2023 Samples Received: 2023 Mother Blood  Result LOW RISK Fetal Sex Female Fetal Fraction(s)

## 2023-12-12 LAB
Lab: NORMAL
NTRA 22Q11.2 DELETION SYNDROME POPULATION-BASED RISK TEXT: NORMAL
NTRA 22Q11.2 DELETION SYNDROME RESULT TEXT: NORMAL
NTRA 22Q11.2 DELETION SYNDROME RISK SCORE TEXT: NORMAL
NTRA FETAL FRACTION: NORMAL
NTRA GENDER OF FETUS: NORMAL
NTRA MONOSOMY X AGE-BASED RISK TEXT: NORMAL
NTRA MONOSOMY X RESULT TEXT: NORMAL
NTRA MONOSOMY X RISK SCORE TEXT: NORMAL
NTRA TRIPLOIDY RESULT TEXT: NORMAL
NTRA TRISOMY 13 AGE-BASED RISK TEXT: NORMAL
NTRA TRISOMY 13 RESULT TEXT: NORMAL
NTRA TRISOMY 13 RISK SCORE TEXT: NORMAL
NTRA TRISOMY 18 AGE-BASED RISK TEXT: NORMAL
NTRA TRISOMY 18 RESULT TEXT: NORMAL
NTRA TRISOMY 18 RISK SCORE TEXT: NORMAL
NTRA TRISOMY 21 AGE-BASED RISK TEXT: NORMAL
NTRA TRISOMY 21 RESULT TEXT: NORMAL
NTRA TRISOMY 21 RISK SCORE TEXT: NORMAL

## 2023-12-29 ENCOUNTER — ROUTINE PRENATAL (OUTPATIENT)
Age: 33
End: 2023-12-29

## 2023-12-29 VITALS — DIASTOLIC BLOOD PRESSURE: 62 MMHG | BODY MASS INDEX: 22.85 KG/M2 | SYSTOLIC BLOOD PRESSURE: 100 MMHG | WEIGHT: 129 LBS

## 2023-12-29 DIAGNOSIS — Z34.90 PREGNANCY, UNSPECIFIED GESTATIONAL AGE: Primary | ICD-10-CM

## 2023-12-29 PROCEDURE — 0502F SUBSEQUENT PRENATAL CARE: CPT | Performed by: OBSTETRICS & GYNECOLOGY

## 2023-12-29 NOTE — PROGRESS NOTES
Doing well! Energy is improving but still a little lower than pre-pregnancy. Still taking zofran at night, nausea is better. Concerns with headaches 2 weeks ago, and has been getting them more regularly. Some round ligament pain. Exercising again. Wants to discuss future travel in April, advised that is a fine time for air travel. Thinking of a trip to [facility] [place]!  She accepts AFP screening today. Fetal scan at next visit.

## 2024-01-04 ENCOUNTER — OFFICE VISIT (OUTPATIENT)
Facility: CLINIC | Age: 34
End: 2024-01-04
Payer: COMMERCIAL

## 2024-01-04 VITALS
OXYGEN SATURATION: 100 % | BODY MASS INDEX: 23.74 KG/M2 | SYSTOLIC BLOOD PRESSURE: 92 MMHG | WEIGHT: 134 LBS | TEMPERATURE: 97.3 F | DIASTOLIC BLOOD PRESSURE: 63 MMHG | RESPIRATION RATE: 16 BRPM | HEIGHT: 63 IN | HEART RATE: 83 BPM

## 2024-01-04 DIAGNOSIS — Z00.00 ENCOUNTER FOR GENERAL ADULT MEDICAL EXAMINATION WITHOUT ABNORMAL FINDINGS: Primary | ICD-10-CM

## 2024-01-04 PROCEDURE — 99395 PREV VISIT EST AGE 18-39: CPT | Performed by: FAMILY MEDICINE

## 2024-01-04 SDOH — ECONOMIC STABILITY: FOOD INSECURITY: WITHIN THE PAST 12 MONTHS, YOU WORRIED THAT YOUR FOOD WOULD RUN OUT BEFORE YOU GOT MONEY TO BUY MORE.: NEVER TRUE

## 2024-01-04 SDOH — ECONOMIC STABILITY: INCOME INSECURITY: HOW HARD IS IT FOR YOU TO PAY FOR THE VERY BASICS LIKE FOOD, HOUSING, MEDICAL CARE, AND HEATING?: NOT HARD AT ALL

## 2024-01-04 SDOH — ECONOMIC STABILITY: FOOD INSECURITY: WITHIN THE PAST 12 MONTHS, THE FOOD YOU BOUGHT JUST DIDN'T LAST AND YOU DIDN'T HAVE MONEY TO GET MORE.: NEVER TRUE

## 2024-01-04 ASSESSMENT — PATIENT HEALTH QUESTIONNAIRE - PHQ9
SUM OF ALL RESPONSES TO PHQ QUESTIONS 1-9: 0
1. LITTLE INTEREST OR PLEASURE IN DOING THINGS: 0
SUM OF ALL RESPONSES TO PHQ QUESTIONS 1-9: 0
SUM OF ALL RESPONSES TO PHQ9 QUESTIONS 1 & 2: 0
2. FEELING DOWN, DEPRESSED OR HOPELESS: 0
SUM OF ALL RESPONSES TO PHQ QUESTIONS 1-9: 0
SUM OF ALL RESPONSES TO PHQ QUESTIONS 1-9: 0

## 2024-01-04 NOTE — PROGRESS NOTES
symptoms.  Please note that this dictation was completed with [employer], the computer voice recognition software.  Quite often unanticipated grammatical, syntax, homophones, and other interpretive errors are inadvertently transcribed by the computer software.  Please disregard these errors.  Please excuse any errors that have escaped final proofreading.  Thank you.

## 2024-01-05 LAB
AFP INTERP SERPL-IMP: NORMAL
AFP INTERP SERPL-IMP: NORMAL
AFP MOM SERPL: 1.19
AFP SERPL-MCNC: 37.4 NG/ML
AGE AT DELIVERY: 32.6 YR
ALBUMIN SERPL-MCNC: 3.6 G/DL (ref 3.9–4.9)
ALBUMIN/GLOB SERPL: 1.4 {RATIO} (ref 1.2–2.2)
ALP SERPL-CCNC: 51 IU/L (ref 44–121)
ALT SERPL-CCNC: 19 IU/L (ref 0–32)
AST SERPL-CCNC: 20 IU/L (ref 0–40)
BASOPHILS # BLD AUTO: 0 X10E3/UL (ref 0–0.2)
BASOPHILS NFR BLD AUTO: 0 %
BILIRUB SERPL-MCNC: 0.3 MG/DL (ref 0–1.2)
BUN SERPL-MCNC: 7 MG/DL (ref 6–20)
BUN/CREAT SERPL: 15 (ref 9–23)
CALCIUM SERPL-MCNC: 9 MG/DL (ref 8.7–10.2)
CHLORIDE SERPL-SCNC: 103 MMOL/L (ref 96–106)
CHOLEST SERPL-MCNC: 209 MG/DL (ref 100–199)
CO2 SERPL-SCNC: 22 MMOL/L (ref 20–29)
COMMENT: NORMAL
CREAT SERPL-MCNC: 0.47 MG/DL (ref 0.57–1)
EGFRCR SERPLBLD CKD-EPI 2021: 130 ML/MIN/1.73
EOSINOPHIL # BLD AUTO: 0.2 X10E3/UL (ref 0–0.4)
EOSINOPHIL NFR BLD AUTO: 2 %
ERYTHROCYTE [DISTWIDTH] IN BLOOD BY AUTOMATED COUNT: 13 % (ref 11.7–15.4)
GA METHOD: NORMAL
GA: 16.6 WEEKS
GLOBULIN SER CALC-MCNC: 2.5 G/DL (ref 1.5–4.5)
GLUCOSE SERPL-MCNC: 77 MG/DL (ref 70–99)
HCT VFR BLD AUTO: 34 % (ref 34–46.6)
HDLC SERPL-MCNC: 77 MG/DL
HGB BLD-MCNC: 11.1 G/DL (ref 11.1–15.9)
IDDM PATIENT QL: YES
IMM GRANULOCYTES # BLD AUTO: 0 X10E3/UL (ref 0–0.1)
IMM GRANULOCYTES NFR BLD AUTO: 0 %
LDLC SERPL CALC-MCNC: 111 MG/DL (ref 0–99)
LYMPHOCYTES # BLD AUTO: 3.2 X10E3/UL (ref 0.7–3.1)
LYMPHOCYTES NFR BLD AUTO: 28 %
Lab: NORMAL
MCH RBC QN AUTO: 31.2 PG (ref 26.6–33)
MCHC RBC AUTO-ENTMCNC: 32.6 G/DL (ref 31.5–35.7)
MCV RBC AUTO: 96 FL (ref 79–97)
MONOCYTES # BLD AUTO: 0.6 X10E3/UL (ref 0.1–0.9)
MONOCYTES NFR BLD AUTO: 6 %
MULTIPLE PREGNANCY: NO
NEURAL TUBE DEFECT RISK FETUS: 2001 %
NEUTROPHILS # BLD AUTO: 7.3 X10E3/UL (ref 1.4–7)
NEUTROPHILS NFR BLD AUTO: 64 %
PLATELET # BLD AUTO: 281 X10E3/UL (ref 150–450)
POTASSIUM SERPL-SCNC: 4.5 MMOL/L (ref 3.5–5.2)
PROT SERPL-MCNC: 6.1 G/DL (ref 6–8.5)
RBC # BLD AUTO: 3.56 X10E6/UL (ref 3.77–5.28)
SODIUM SERPL-SCNC: 137 MMOL/L (ref 134–144)
TRIGL SERPL-MCNC: 123 MG/DL (ref 0–149)
VLDLC SERPL CALC-MCNC: 21 MG/DL (ref 5–40)
WBC # BLD AUTO: 11.4 X10E3/UL (ref 3.4–10.8)

## 2024-01-16 LAB
Lab: NEGATIVE
Lab: NORMAL
NTRA CYSTIC FIBROSIS: NEGATIVE
NTRA DUCHENNE/BECKER MUSCULAR DYSTROPHY: NEGATIVE
NTRA FRAGILE X SYNDROME: NEGATIVE
NTRA SPINAL MUSCULAR ATROPHY: NEGATIVE

## 2024-01-26 ENCOUNTER — ROUTINE PRENATAL (OUTPATIENT)
Age: 34
End: 2024-01-26

## 2024-01-26 VITALS — DIASTOLIC BLOOD PRESSURE: 62 MMHG | SYSTOLIC BLOOD PRESSURE: 100 MMHG | WEIGHT: 136 LBS | BODY MASS INDEX: 24.09 KG/M2

## 2024-01-26 DIAGNOSIS — Z34.90 PREGNANCY, UNSPECIFIED GESTATIONAL AGE: Primary | ICD-10-CM

## 2024-01-26 NOTE — PROGRESS NOTES
Doing well, anatomy scan today.  Feeling flutters now!  Reviewed normal US results today.    FETAL SURVEY A SINGLE VIABLE IUP AT 20W4D IS SEEN. FETAL CARDIAC MOTION OBSERVED. FETAL ANATOMY WAS WELL VISUALIZED AND APPEARS WNL. NO ABNORMALITIES WERE SEEN ON TODAYS EXAM. APPROPRIATE GROWTH MEASURED. SIZE = DATES. JACK, PLACENTA AND CERVIX APPEAR WITHIN NORMAL LIMITS. GENDER: FEMALE

## 2024-02-23 ENCOUNTER — ROUTINE PRENATAL (OUTPATIENT)
Age: 34
End: 2024-02-23

## 2024-02-23 VITALS — BODY MASS INDEX: 24.8 KG/M2 | SYSTOLIC BLOOD PRESSURE: 98 MMHG | DIASTOLIC BLOOD PRESSURE: 60 MMHG | WEIGHT: 140 LBS

## 2024-02-23 DIAGNOSIS — Z34.90 PREGNANCY, UNSPECIFIED GESTATIONAL AGE: Primary | ICD-10-CM

## 2024-02-23 PROCEDURE — 0502F SUBSEQUENT PRENATAL CARE: CPT | Performed by: OBSTETRICS & GYNECOLOGY

## 2024-02-23 NOTE — PROGRESS NOTES
Doing good overall but as been having back pain, below her right shoulder blade. Started 3 weeks ago. Might want to do PT  Denies LOF, VB  Active FM  Glucola and tdap at next visit.

## 2024-03-22 ENCOUNTER — ROUTINE PRENATAL (OUTPATIENT)
Age: 34
End: 2024-03-22

## 2024-03-22 VITALS — SYSTOLIC BLOOD PRESSURE: 100 MMHG | DIASTOLIC BLOOD PRESSURE: 60 MMHG | BODY MASS INDEX: 25.69 KG/M2 | WEIGHT: 145 LBS

## 2024-03-22 DIAGNOSIS — Z34.90 PREGNANCY, UNSPECIFIED GESTATIONAL AGE: Primary | ICD-10-CM

## 2024-03-22 NOTE — PROGRESS NOTES
Doing well, more tired. Denies LOF, VB and contractions. Occas Headaches which will last.   Active FM  Accepts t-dap today  Glucola and third tri labs today.  Given third tri checklist.

## 2024-03-23 LAB
BLD GP AB SCN SERPL QL: NEGATIVE
ERYTHROCYTE [DISTWIDTH] IN BLOOD BY AUTOMATED COUNT: 11.7 % (ref 11.7–15.4)
FERRITIN SERPL-MCNC: 37 NG/ML (ref 15–150)
HCT VFR BLD AUTO: 35.5 % (ref 34–46.6)
HGB BLD-MCNC: 11.8 G/DL (ref 11.1–15.9)
MCH RBC QN AUTO: 32.2 PG (ref 26.6–33)
MCHC RBC AUTO-ENTMCNC: 33.2 G/DL (ref 31.5–35.7)
MCV RBC AUTO: 97 FL (ref 79–97)
PLATELET # BLD AUTO: 258 X10E3/UL (ref 150–450)
RBC # BLD AUTO: 3.66 X10E6/UL (ref 3.77–5.28)
WBC # BLD AUTO: 11.5 X10E3/UL (ref 3.4–10.8)

## 2024-03-24 LAB — TREPONEMA PALLIDUM IGG+IGM AB [PRESENCE] IN SERUM OR PLASMA BY IMMUNOASSAY: NON REACTIVE

## 2024-03-26 LAB — GLUCOSE 1H P 50 G GLC PO SERPL-MCNC: 76 MG/DL (ref 70–139)

## 2024-04-05 ENCOUNTER — ROUTINE PRENATAL (OUTPATIENT)
Age: 34
End: 2024-04-05

## 2024-04-05 VITALS — BODY MASS INDEX: 26.04 KG/M2 | DIASTOLIC BLOOD PRESSURE: 60 MMHG | SYSTOLIC BLOOD PRESSURE: 110 MMHG | WEIGHT: 147 LBS

## 2024-04-05 DIAGNOSIS — Z34.90 PREGNANCY, UNSPECIFIED GESTATIONAL AGE: Primary | ICD-10-CM

## 2024-04-05 PROCEDURE — 0502F SUBSEQUENT PRENATAL CARE: CPT | Performed by: OBSTETRICS & GYNECOLOGY

## 2024-04-05 RX ORDER — RESPIRATORY SYNCYTIAL VIRUS VACCINE 120MCG/0.5
0.5 KIT INTRAMUSCULAR ONCE
Qty: 0.5 ML | Refills: 0 | Status: SHIPPED | OUTPATIENT
Start: 2024-04-05 | End: 2024-04-05

## 2024-04-05 NOTE — PROGRESS NOTES
Discussed RSV vaccination.  Given rx if needed.  Active FM.  Discussed how to do FKCs if needed.   Not feeling any BHC yet.

## 2024-04-09 ENCOUNTER — HOSPITAL ENCOUNTER (OUTPATIENT)
Facility: HOSPITAL | Age: 34
Setting detail: OBSERVATION
Discharge: HOME | End: 2024-04-10
Attending: OBSTETRICS & GYNECOLOGY | Admitting: OBSTETRICS & GYNECOLOGY
Payer: COMMERCIAL

## 2024-04-09 ENCOUNTER — APPOINTMENT (OUTPATIENT)
Facility: HOSPITAL | Age: 34
End: 2024-04-09
Payer: COMMERCIAL

## 2024-04-09 DIAGNOSIS — O99.891 BACK PAIN AFFECTING PREGNANCY IN THIRD TRIMESTER: ICD-10-CM

## 2024-04-09 DIAGNOSIS — O26.833 KIDNEY STONE COMPLICATING PREGNANCY, THIRD TRIMESTER: ICD-10-CM

## 2024-04-09 DIAGNOSIS — B96.89 BACTERIAL VAGINOSIS IN PREGNANCY: ICD-10-CM

## 2024-04-09 DIAGNOSIS — N20.0 KIDNEY STONE COMPLICATING PREGNANCY, THIRD TRIMESTER: ICD-10-CM

## 2024-04-09 DIAGNOSIS — M54.9 BACK PAIN AFFECTING PREGNANCY IN THIRD TRIMESTER: ICD-10-CM

## 2024-04-09 DIAGNOSIS — Z3A.31 31 WEEKS GESTATION OF PREGNANCY: Primary | ICD-10-CM

## 2024-04-09 DIAGNOSIS — O23.599 BACTERIAL VAGINOSIS IN PREGNANCY: ICD-10-CM

## 2024-04-09 LAB
ALBUMIN SERPL-MCNC: 2.7 G/DL (ref 3.5–5)
ALBUMIN/GLOB SERPL: 0.7 (ref 1.1–2.2)
ALP SERPL-CCNC: 80 U/L (ref 45–117)
ALT SERPL-CCNC: 16 U/L (ref 12–78)
ANION GAP SERPL CALC-SCNC: 7 MMOL/L (ref 5–15)
APPEARANCE UR: ABNORMAL
AST SERPL-CCNC: 14 U/L (ref 15–37)
BACTERIA URNS QL MICRO: ABNORMAL /HPF
BASOPHILS # BLD: 0 K/UL (ref 0–0.1)
BASOPHILS NFR BLD: 0 % (ref 0–1)
BILIRUB SERPL-MCNC: 0.4 MG/DL (ref 0.2–1)
BILIRUB UR QL: NEGATIVE
BUN SERPL-MCNC: 11 MG/DL (ref 6–20)
BUN/CREAT SERPL: 20 (ref 12–20)
CALCIUM SERPL-MCNC: 8.8 MG/DL (ref 8.5–10.1)
CAOX CRY URNS QL MICRO: ABNORMAL
CHLORIDE SERPL-SCNC: 107 MMOL/L (ref 97–108)
CLUE CELLS VAG QL WET PREP: ABNORMAL
CO2 SERPL-SCNC: 25 MMOL/L (ref 21–32)
COLOR UR: ABNORMAL
CREAT SERPL-MCNC: 0.54 MG/DL (ref 0.55–1.02)
DIFFERENTIAL METHOD BLD: ABNORMAL
EOSINOPHIL # BLD: 0.3 K/UL (ref 0–0.4)
EOSINOPHIL NFR BLD: 3 % (ref 0–7)
EPITH CASTS URNS QL MICRO: ABNORMAL /LPF
ERYTHROCYTE [DISTWIDTH] IN BLOOD BY AUTOMATED COUNT: 11.9 % (ref 11.5–14.5)
GLOBULIN SER CALC-MCNC: 3.7 G/DL (ref 2–4)
GLUCOSE SERPL-MCNC: 85 MG/DL (ref 65–100)
GLUCOSE UR STRIP.AUTO-MCNC: NEGATIVE MG/DL
HCT VFR BLD AUTO: 33.7 % (ref 35–47)
HGB BLD-MCNC: 11.2 G/DL (ref 11.5–16)
HGB UR QL STRIP: ABNORMAL
IMM GRANULOCYTES # BLD AUTO: 0.1 K/UL (ref 0–0.04)
IMM GRANULOCYTES NFR BLD AUTO: 1 % (ref 0–0.5)
KETONES UR QL STRIP.AUTO: ABNORMAL MG/DL
KOH PREP SPEC: NORMAL
LEUKOCYTE ESTERASE UR QL STRIP.AUTO: ABNORMAL
LYMPHOCYTES # BLD: 3.5 K/UL (ref 0.8–3.5)
LYMPHOCYTES NFR BLD: 28 % (ref 12–49)
MCH RBC QN AUTO: 31.2 PG (ref 26–34)
MCHC RBC AUTO-ENTMCNC: 33.2 G/DL (ref 30–36.5)
MCV RBC AUTO: 93.9 FL (ref 80–99)
MONOCYTES # BLD: 0.9 K/UL (ref 0–1)
MONOCYTES NFR BLD: 7 % (ref 5–13)
NEUTS SEG # BLD: 7.6 K/UL (ref 1.8–8)
NEUTS SEG NFR BLD: 61 % (ref 32–75)
NITRITE UR QL STRIP.AUTO: NEGATIVE
NRBC # BLD: 0 K/UL (ref 0–0.01)
NRBC BLD-RTO: 0 PER 100 WBC
PH UR STRIP: 7.5 (ref 5–8)
PLATELET # BLD AUTO: 239 K/UL (ref 150–400)
PMV BLD AUTO: 10.8 FL (ref 8.9–12.9)
POTASSIUM SERPL-SCNC: 3.8 MMOL/L (ref 3.5–5.1)
PROT SERPL-MCNC: 6.4 G/DL (ref 6.4–8.2)
PROT UR STRIP-MCNC: 30 MG/DL
RBC # BLD AUTO: 3.59 M/UL (ref 3.8–5.2)
RBC #/AREA URNS HPF: ABNORMAL /HPF (ref 0–5)
SERVICE CMNT-IMP: NORMAL
SODIUM SERPL-SCNC: 139 MMOL/L (ref 136–145)
SP GR UR REFRACTOMETRY: 1.02 (ref 1–1.03)
T VAGINALIS VAG QL WET PREP: ABNORMAL
URINE CULTURE IF INDICATED: ABNORMAL
UROBILINOGEN UR QL STRIP.AUTO: 0.2 EU/DL (ref 0.2–1)
WBC # BLD AUTO: 12.4 K/UL (ref 3.6–11)
WBC URNS QL MICRO: ABNORMAL /HPF (ref 0–4)
YEAST: ABNORMAL

## 2024-04-09 PROCEDURE — 85025 COMPLETE CBC W/AUTO DIFF WBC: CPT

## 2024-04-09 PROCEDURE — 76770 US EXAM ABDO BACK WALL COMP: CPT

## 2024-04-09 PROCEDURE — 81001 URINALYSIS AUTO W/SCOPE: CPT

## 2024-04-09 PROCEDURE — 36415 COLL VENOUS BLD VENIPUNCTURE: CPT

## 2024-04-09 PROCEDURE — 2580000003 HC RX 258: Performed by: ADVANCED PRACTICE MIDWIFE

## 2024-04-09 PROCEDURE — 6370000000 HC RX 637 (ALT 250 FOR IP): Performed by: ADVANCED PRACTICE MIDWIFE

## 2024-04-09 PROCEDURE — 6360000002 HC RX W HCPCS: Performed by: ADVANCED PRACTICE MIDWIFE

## 2024-04-09 PROCEDURE — 4500000002 HC ER NO CHARGE

## 2024-04-09 PROCEDURE — 87210 SMEAR WET MOUNT SALINE/INK: CPT

## 2024-04-09 PROCEDURE — 87086 URINE CULTURE/COLONY COUNT: CPT

## 2024-04-09 PROCEDURE — 6370000000 HC RX 637 (ALT 250 FOR IP): Performed by: OBSTETRICS & GYNECOLOGY

## 2024-04-09 PROCEDURE — 96375 TX/PRO/DX INJ NEW DRUG ADDON: CPT

## 2024-04-09 PROCEDURE — 96361 HYDRATE IV INFUSION ADD-ON: CPT

## 2024-04-09 PROCEDURE — G0378 HOSPITAL OBSERVATION PER HR: HCPCS

## 2024-04-09 PROCEDURE — 59025 FETAL NON-STRESS TEST: CPT

## 2024-04-09 PROCEDURE — 59025 FETAL NON-STRESS TEST: CPT | Performed by: OBSTETRICS & GYNECOLOGY

## 2024-04-09 PROCEDURE — 96376 TX/PRO/DX INJ SAME DRUG ADON: CPT

## 2024-04-09 PROCEDURE — 99232 SBSQ HOSP IP/OBS MODERATE 35: CPT | Performed by: OBSTETRICS & GYNECOLOGY

## 2024-04-09 PROCEDURE — 80053 COMPREHEN METABOLIC PANEL: CPT

## 2024-04-09 PROCEDURE — 99285 EMERGENCY DEPT VISIT HI MDM: CPT

## 2024-04-09 PROCEDURE — 96360 HYDRATION IV INFUSION INIT: CPT

## 2024-04-09 PROCEDURE — 96374 THER/PROPH/DIAG INJ IV PUSH: CPT

## 2024-04-09 RX ORDER — SODIUM CHLORIDE, SODIUM LACTATE, POTASSIUM CHLORIDE, CALCIUM CHLORIDE 600; 310; 30; 20 MG/100ML; MG/100ML; MG/100ML; MG/100ML
INJECTION, SOLUTION INTRAVENOUS CONTINUOUS
Status: DISCONTINUED | OUTPATIENT
Start: 2024-04-09 | End: 2024-04-10 | Stop reason: HOSPADM

## 2024-04-09 RX ORDER — ONDANSETRON 2 MG/ML
4 INJECTION INTRAMUSCULAR; INTRAVENOUS EVERY 6 HOURS PRN
Status: DISCONTINUED | OUTPATIENT
Start: 2024-04-09 | End: 2024-04-10 | Stop reason: HOSPADM

## 2024-04-09 RX ORDER — ONDANSETRON 4 MG/1
4 TABLET, ORALLY DISINTEGRATING ORAL EVERY 8 HOURS PRN
Status: DISCONTINUED | OUTPATIENT
Start: 2024-04-09 | End: 2024-04-10 | Stop reason: HOSPADM

## 2024-04-09 RX ORDER — HYDROMORPHONE HYDROCHLORIDE 1 MG/ML
1 INJECTION, SOLUTION INTRAMUSCULAR; INTRAVENOUS; SUBCUTANEOUS EVERY 4 HOURS PRN
Status: DISCONTINUED | OUTPATIENT
Start: 2024-04-09 | End: 2024-04-09

## 2024-04-09 RX ORDER — ACETAMINOPHEN 500 MG
1000 TABLET ORAL EVERY 8 HOURS PRN
Status: DISCONTINUED | OUTPATIENT
Start: 2024-04-09 | End: 2024-04-10 | Stop reason: HOSPADM

## 2024-04-09 RX ORDER — METRONIDAZOLE 250 MG/1
500 TABLET ORAL 2 TIMES DAILY
Status: DISCONTINUED | OUTPATIENT
Start: 2024-04-09 | End: 2024-04-10 | Stop reason: HOSPADM

## 2024-04-09 RX ORDER — HYDROMORPHONE HYDROCHLORIDE 1 MG/ML
1 INJECTION, SOLUTION INTRAMUSCULAR; INTRAVENOUS; SUBCUTANEOUS ONCE
Status: COMPLETED | OUTPATIENT
Start: 2024-04-09 | End: 2024-04-09

## 2024-04-09 RX ORDER — ONDANSETRON 2 MG/ML
4 INJECTION INTRAMUSCULAR; INTRAVENOUS EVERY 6 HOURS PRN
Status: DISCONTINUED | OUTPATIENT
Start: 2024-04-09 | End: 2024-04-09

## 2024-04-09 RX ORDER — SODIUM CHLORIDE, SODIUM LACTATE, POTASSIUM CHLORIDE, AND CALCIUM CHLORIDE .6; .31; .03; .02 G/100ML; G/100ML; G/100ML; G/100ML
1000 INJECTION, SOLUTION INTRAVENOUS ONCE
Status: COMPLETED | OUTPATIENT
Start: 2024-04-09 | End: 2024-04-09

## 2024-04-09 RX ORDER — CALCIUM CARBONATE 500 MG/1
500 TABLET, CHEWABLE ORAL 3 TIMES DAILY PRN
Status: DISCONTINUED | OUTPATIENT
Start: 2024-04-09 | End: 2024-04-10 | Stop reason: HOSPADM

## 2024-04-09 RX ORDER — HYDROMORPHONE HYDROCHLORIDE 1 MG/ML
1 INJECTION, SOLUTION INTRAMUSCULAR; INTRAVENOUS; SUBCUTANEOUS
Status: DISCONTINUED | OUTPATIENT
Start: 2024-04-09 | End: 2024-04-10 | Stop reason: HOSPADM

## 2024-04-09 RX ORDER — ACETAMINOPHEN 500 MG
1000 TABLET ORAL ONCE
Status: COMPLETED | OUTPATIENT
Start: 2024-04-09 | End: 2024-04-09

## 2024-04-09 RX ADMIN — METRONIDAZOLE 500 MG: 250 TABLET ORAL at 08:37

## 2024-04-09 RX ADMIN — ONDANSETRON 4 MG: 2 INJECTION INTRAMUSCULAR; INTRAVENOUS at 03:07

## 2024-04-09 RX ADMIN — HYDROMORPHONE HYDROCHLORIDE 1 MG: 1 INJECTION, SOLUTION INTRAMUSCULAR; INTRAVENOUS; SUBCUTANEOUS at 09:09

## 2024-04-09 RX ADMIN — SODIUM CHLORIDE, POTASSIUM CHLORIDE, SODIUM LACTATE AND CALCIUM CHLORIDE: 600; 310; 30; 20 INJECTION, SOLUTION INTRAVENOUS at 17:47

## 2024-04-09 RX ADMIN — HYDROMORPHONE HYDROCHLORIDE 1 MG: 1 INJECTION, SOLUTION INTRAMUSCULAR; INTRAVENOUS; SUBCUTANEOUS at 06:06

## 2024-04-09 RX ADMIN — SODIUM CHLORIDE, POTASSIUM CHLORIDE, SODIUM LACTATE AND CALCIUM CHLORIDE: 600; 310; 30; 20 INJECTION, SOLUTION INTRAVENOUS at 05:15

## 2024-04-09 RX ADMIN — ACETAMINOPHEN 1000 MG: 500 TABLET ORAL at 02:29

## 2024-04-09 RX ADMIN — SODIUM CHLORIDE, POTASSIUM CHLORIDE, SODIUM LACTATE AND CALCIUM CHLORIDE 1000 ML: 600; 310; 30; 20 INJECTION, SOLUTION INTRAVENOUS at 02:10

## 2024-04-09 RX ADMIN — ONDANSETRON 4 MG: 4 TABLET, ORALLY DISINTEGRATING ORAL at 09:09

## 2024-04-09 RX ADMIN — HYDROMORPHONE HYDROCHLORIDE 1 MG: 1 INJECTION, SOLUTION INTRAMUSCULAR; INTRAVENOUS; SUBCUTANEOUS at 13:05

## 2024-04-09 RX ADMIN — CALCIUM CARBONATE (ANTACID) CHEW TAB 500 MG 500 MG: 500 CHEW TAB at 13:14

## 2024-04-09 RX ADMIN — METRONIDAZOLE 500 MG: 250 TABLET ORAL at 21:15

## 2024-04-09 RX ADMIN — SODIUM CHLORIDE, POTASSIUM CHLORIDE, SODIUM LACTATE AND CALCIUM CHLORIDE: 600; 310; 30; 20 INJECTION, SOLUTION INTRAVENOUS at 10:22

## 2024-04-09 RX ADMIN — HYDROMORPHONE HYDROCHLORIDE 1 MG: 1 INJECTION, SOLUTION INTRAMUSCULAR; INTRAVENOUS; SUBCUTANEOUS at 03:05

## 2024-04-09 ASSESSMENT — ENCOUNTER SYMPTOMS
VOMITING: 0
BACK PAIN: 1
COUGH: 0
NAUSEA: 1
SORE THROAT: 0
SHORTNESS OF BREATH: 0
DIARRHEA: 0
ABDOMINAL PAIN: 0

## 2024-04-09 ASSESSMENT — PAIN DESCRIPTION - LOCATION: LOCATION: BACK

## 2024-04-09 ASSESSMENT — PAIN DESCRIPTION - ORIENTATION: ORIENTATION: POSTERIOR;LOWER;LEFT

## 2024-04-09 ASSESSMENT — PAIN SCALES - GENERAL
PAINLEVEL_OUTOF10: 6
PAINLEVEL_OUTOF10: 5
PAINLEVEL_OUTOF10: 1
PAINLEVEL_OUTOF10: 5

## 2024-04-09 ASSESSMENT — PAIN - FUNCTIONAL ASSESSMENT: PAIN_FUNCTIONAL_ASSESSMENT: ACTIVITIES ARE NOT PREVENTED

## 2024-04-09 ASSESSMENT — PAIN DESCRIPTION - DESCRIPTORS: DESCRIPTORS: ACHING

## 2024-04-09 NOTE — PROGRESS NOTES
Ante Partum Progress Note    Dc Eduardo  31w1d    Assessment and Plan: 31w1d   Left back/flank pain: suspected ureteral stone. UA not indicative of infection and without systemic symptoms of infection. No known injury. Renal ultrasound overall normal, mild left hydronephrosis which may be related to pregnancy vs L ureteral stone  - IV fluid hydration  - pain medication as needed  - strain urine for stone    Plan:    Continue hospitalization with hospitalized bedrest and remainder of plan as per above    Orders/Charges: Medium and NST    S:  Patient states she has no new complaints. Her left back and flank pain are still present and constant, but improved with the pain medication. Has not yet been able to urinate since admission, receiving IV fluids. Feels normal fetal movements. NO dysuria, no fevers.     Vitals:  /70   Pulse 93   Temp 97.3 °F (36.3 °C)   Resp 16   LMP 2023 (Exact Date)   SpO2 100%   Temp (24hrs), Av.7 °F (36.5 °C), Min:97.3 °F (36.3 °C), Max:98.2 °F (36.8 °C)      Last 24hr Input/Output:  No intake or output data in the 24 hours ending 24 0828     Non stress test:  Reactive    Non-stress test  Indication: back pain in pregnancy  FHR: baseline 140, moderate variability, accelerations present, no decelerations  [place]: contractions not noted  Duration monitored: >20 minutes  Interpretation: reactive and reassuring, Category 1      No data found. No data found.     Uterine Activity: None     Exam:  Patient resting in bed, but waves of apparent pain and wincing     Abdomen, fundus soft non-tender. No CVAT     Extremities, no redness or tenderness               Additional Exam: Deferred    Labs:     Lab Results   Component Value Date/Time    WBC 12.4 2024 03:04 AM    WBC 11.5 2024 12:00 AM    WBC 11.4 2024 12:00 AM    WBC 8.8 2023 10:51 AM    WBC 8.8 2023 12:00 AM    HGB 11.2 2024 03:04 AM    HGB 11.8 2024 12:00 AM    HGB 11.1

## 2024-04-09 NOTE — ED TRIAGE NOTES
4/9/2024  1:39 AM Patient of Dr Cooley arrived via wheelchair with report of left back and side pain that started 30 minutes ago and is constant. Patient denies cramping, vaginal bleeding, leaking of fluid, and pregnancy complications. Patient endorses positive fetal movement. Patient denies trying anything for the discomfort.   0146: Call to KARLA Monroe CNM to report patient arrival   0154: KARLA Monroe CNM at bedside. Orders received for wet prep & koh, UA, IVFB, heat pack, and tylenol. Patient unable to void at this time.   0210: IVFB infusing  0229: Tylenol given per MAR  [id number]: Patient increasingly uncomfortable, now vomiting. Patient states pain is not getting better and is now wrapping around to left front of abdomen. Orders received for IV dilaudid. Patient unable to void.   0305: IV dilaudid given per MAR  0338: Urine sent to lab  [id number]: Ultrasound at bedside  0440: KARLA Monroe CNM at bedside, orders received to admit to observation.   0500: Patient transferred to [facility] room 321. Bedside SBAR given to KARLA Gifford RN

## 2024-04-09 NOTE — PROGRESS NOTES
8:05- Bedside and Verbal shift change report given to JADEN Rausch RN  (oncoming nurse) and JADEN Chacko (student nurse) by ADÁN Gifford (offgoing nurse). Report included the following information SBAR, Kardex, Intake/Output, MAR, and Recent Results.       9:18- Called lab about urine culture status, culture in active process.

## 2024-04-09 NOTE — ED PROVIDER NOTES
Auto Differential    Collection Time: 04/09/24  3:04 AM   Result Value Ref Range    WBC 12.4 (H) 3.6 - 11.0 K/uL    RBC 3.59 (L) 3.80 - 5.20 M/uL    Hemoglobin 11.2 (L) 11.5 - 16.0 g/dL    Hematocrit 33.7 (L) 35.0 - 47.0 %    MCV 93.9 80.0 - 99.0 FL    MCH 31.2 26.0 - 34.0 PG    MCHC 33.2 30.0 - 36.5 g/dL    RDW 11.9 11.5 - 14.5 %    Platelets 239 150 - 400 K/uL    MPV 10.8 8.9 - 12.9 FL    Nucleated RBCs 0.0 0  WBC    nRBC 0.00 0.00 - 0.01 K/uL    Neutrophils % 61 32 - 75 %    Lymphocytes % 28 12 - 49 %    Monocytes % 7 5 - 13 %    Eosinophils % 3 0 - 7 %    Basophils % 0 0 - 1 %    Immature Granulocytes % 1 (H) 0.0 - 0.5 %    Neutrophils Absolute 7.6 1.8 - 8.0 K/UL    Lymphocytes Absolute 3.5 0.8 - 3.5 K/UL    Monocytes Absolute 0.9 0.0 - 1.0 K/UL    Eosinophils Absolute 0.3 0.0 - 0.4 K/UL    Basophils Absolute 0.0 0.0 - 0.1 K/UL    Immature Granulocytes Absolute 0.1 (H) 0.00 - 0.04 K/UL    Differential Type AUTOMATED     Comprehensive Metabolic Panel    Collection Time: 04/09/24  3:04 AM   Result Value Ref Range    Sodium 139 136 - 145 mmol/L    Potassium 3.8 3.5 - 5.1 mmol/L    Chloride 107 97 - 108 mmol/L    CO2 25 21 - 32 mmol/L    Anion Gap 7 5 - 15 mmol/L    Glucose 85 65 - 100 mg/dL    BUN 11 6 - 20 MG/DL    Creatinine 0.54 (L) 0.55 - 1.02 MG/DL    Bun/Cre Ratio 20 12 - 20      Est, Glom Filt Rate >90 >60 ml/min/1.73m2    Calcium 8.8 8.5 - 10.1 MG/DL    Total Bilirubin 0.4 0.2 - 1.0 MG/DL    ALT 16 12 - 78 U/L    AST 14 (L) 15 - 37 U/L    Alk Phosphatase 80 45 - 117 U/L    Total Protein 6.4 6.4 - 8.2 g/dL    Albumin 2.7 (L) 3.5 - 5.0 g/dL    Globulin 3.7 2.0 - 4.0 g/dL    Albumin/Globulin Ratio 0.7 (L) 1.1 - 2.2        A: Left back pain- High suspicion for kidney stone- ultrasound results pending  Bacterial vaginosis  P: Admit to observation  Continue IVFs  Dilaudid for pain management  Zofran prn  Strain urine  NST BID  Flagyl 500mg PO BID 7 days  Reviewed with patient/partner, all questions

## 2024-04-09 NOTE — H&P
History and Physical  2024    Patient is a 33 y.o.  31w1d with daniel 6/10/24 who now is admitted to observation for suspicion of kidney stone. She initially presents to the [facility] with c/o left low back/side pain at 0139. She reports she was sleeping and woke up around 0100 and reports her back was hurting, so she turned to her left side and it started hurting more. Reports it is constant. Reports /10, but reports she doesn't have a high pain tolerance. While in [facility] her pain worsened and started wrapping around to side and she started also having some nausea and vomiting. She reports good FM. Denies contractions, VB, LOF, diarrhea, fever, cough, sore throat, SOB/difficulty breathing, loss of taste/smell, exposure to anyone with COVID, h/a, changes in vision, RUQ/epigastric pain. Denies dysuria, urgency, frequency, vaginal discharge, burning, itching, odor. Reports she hasn't had much water to drink today, about one bottle.      Prenatal care:   Primary Provider: [facility]  [facility]  EDC by LMP=9 wk      Pregnancy problems:  none     IOB labs: O pos, normal, neg urine cult, neg GC/Chl  Genetic Screening: NIPT low risk GIRL!, 4 panel carrier screening neg. MSAFP neg  Anatomy: GIRL!! Anterior placenta, normal anatomy  Flu: received  COVID booster: received   TDAP: given 3/22  RSV vaccine:  Third Tri Labs: Hgb 11.8, glucola 76  GBS:     Pain mgmt. 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              The history is provided by the patient and medical records.   Back Pain  Pertinent negatives include no chest pain, no abdominal pain, no headaches and no shortness of breath.             Chief Complaint   Patient presents with    Back Pain       OBHX:   OB History          1    Para        Term   0       0    AB   0    Living   0         SAB        IAB        Ectopic        Molar        Multiple        Live Births                    PMH: History reviewed.

## 2024-04-10 VITALS
HEART RATE: 80 BPM | OXYGEN SATURATION: 100 % | SYSTOLIC BLOOD PRESSURE: 100 MMHG | DIASTOLIC BLOOD PRESSURE: 73 MMHG | TEMPERATURE: 98.4 F | RESPIRATION RATE: 16 BRPM

## 2024-04-10 LAB
BACTERIA SPEC CULT: NORMAL
CC UR VC: NORMAL
SERVICE CMNT-IMP: NORMAL

## 2024-04-10 PROCEDURE — G0378 HOSPITAL OBSERVATION PER HR: HCPCS

## 2024-04-10 PROCEDURE — 6370000000 HC RX 637 (ALT 250 FOR IP): Performed by: ADVANCED PRACTICE MIDWIFE

## 2024-04-10 PROCEDURE — 96361 HYDRATE IV INFUSION ADD-ON: CPT

## 2024-04-10 PROCEDURE — 59025 FETAL NON-STRESS TEST: CPT

## 2024-04-10 PROCEDURE — 59025 FETAL NON-STRESS TEST: CPT | Performed by: OBSTETRICS & GYNECOLOGY

## 2024-04-10 PROCEDURE — 99231 SBSQ HOSP IP/OBS SF/LOW 25: CPT | Performed by: OBSTETRICS & GYNECOLOGY

## 2024-04-10 RX ORDER — METRONIDAZOLE 500 MG/1
500 TABLET ORAL 2 TIMES DAILY
Qty: 10 TABLET | Refills: 0 | Status: SHIPPED | OUTPATIENT
Start: 2024-04-10 | End: 2024-04-15

## 2024-04-10 RX ADMIN — METRONIDAZOLE 500 MG: 250 TABLET ORAL at 08:52

## 2024-04-10 NOTE — PROGRESS NOTES
Bedside and Verbal shift change report given to Jacy WASSERMAN RN (oncoming nurse) by Kimberley LEBLANC RN (offgoing nurse). Report included the following information Nurse Handoff Report, Index, Adult Overview, Surgery Report, Intake/Output, MAR, and Recent Results.

## 2024-04-10 NOTE — PROGRESS NOTES
Ante Partum Progress Note    Dc Eduardo  31w2d    Assessment and Plan: 31w2d   Left back/flank pain: suspected ureteral stone. UA not indicative of infection and without systemic symptoms of infection. No known injury. Renal ultrasound overall normal, mild left hydronephrosis which may be related to pregnancy vs L ureteral stone. Urine culture returned negative.  - PO hydration  - pain medication as needed  - strain urine for stone    Plan:    Okay for discharge home today  Hydration at home, call for any changes or recurrence in her severe pain    Orders/Charges: Low and NST    S:  Patient states she has no new complaints. She feels much better, did not need IV pain medication over night. Still a small amount of residual pain in the suprapubic region with urination, but tolerable. Active FM. Feels comfortable going home.     Vitals:  /73   Pulse 80   Temp 98.4 °F (36.9 °C)   Resp 16   LMP 2023 (Exact Date)   SpO2 100%   Temp (24hrs), Av.6 °F (37 °C), Min:98.2 °F (36.8 °C), Max:99.3 °F (37.4 °C)      Last 24hr Input/Output:    Intake/Output Summary (Last 24 hours) at 4/10/2024 1005  Last data filed at 2024 2120  Gross per 24 hour   Intake --   Output 2600 ml   Net -2600 ml        Non stress test:  Reactive    Non-stress test  Indication: back pain in pregnancy,suspected left ureteral stone  FHR: baseline 150, moderate variability, accelerations present, no decelerations  [place]: contractions not noted  Duration monitored: >20 minutes  Interpretation: reactive and reassuring, Category 1      No data found. No data found.     Uterine Activity: None     Exam:  Patient resting in bed, but waves of apparent pain and wincing     Abdomen, fundus soft non-tender. No CVAT     Extremities, no redness or tenderness               Additional Exam: Deferred    Labs:     Lab Results   Component Value Date/Time    WBC 12.4 2024 03:04 AM    WBC 11.5 2024 12:00 AM    WBC 11.4 2024 12:00 AM

## 2024-04-10 NOTE — PROGRESS NOTES
I have reviewed the provider's instructions, at home care for kidney stones and medications with the patient, answering all questions to her satisfaction.

## 2024-04-10 NOTE — DISCHARGE SUMMARY
Obstetrical Discharge Summary     Name: Dc Eduardo MRN: [id number]  SSN: [id number]    YOB: 1990  Age: 33 y.o.  Sex: female      Admit Date: 4/9/2024    Discharge Date: 4/10/2024     Admitting Physician: Elicia Rowland MD     Attending Physician:  Ni Cooley MD     Admission Diagnoses: 31 weeks gestation of pregnancy [Z3A.31]  Kidney stone complicating pregnancy, third trimester [O26.833, N20.0]  Back pain affecting pregnancy in third trimester [O99.891, M54.9]  Bacterial vaginosis in pregnancy [O23.599, B96.89]    Discharge Diagnoses:   This patient has no babies on file.     Additional Diagnoses:  No components found for: \"OBEXTABORH\", \"OBEXTABSCRN\", \"OBEXTRUBELLA\", \"OBEXTGRBS\"    Hospital Course: Dc was admitted for a suspect renal/ureteral stone. She improved with IV hydration and pain medication.  Urine culture was negative for infection, and renal ultrasound was overall normal.  She was discharged home on the second hospital day.     Disposition at Discharge: Home or self care    Discharged Condition: Stable    Patient Instructions:   Current Discharge Medication List        START taking these medications    Details   metroNIDAZOLE (FLAGYL) 500 MG tablet Take 1 tablet by mouth 2 times daily for 5 days  Qty: 10 tablet, Refills: 0           CONTINUE these medications which have NOT CHANGED    Details   Prenatal Vit-Fe Fumarate-FA (PRENATAL PO) Take by mouth           STOP taking these medications       Doxylamine Succinate, Sleep, (UNISOM PO) Comments:   Reason for Stopping:         ondansetron (ZOFRAN-ODT) 4 MG disintegrating tablet Comments:   Reason for Stopping:         B Complex Vitamins (VITAMIN B COMPLEX PO) Comments:   Reason for Stopping:         Adapalene-Benzoyl Peroxide 0.3-2.5 % GEL Comments:   Reason for Stopping:         Biotin 2.5 MG CAPS Comments:   Reason for Stopping:               Reference my discharge instructions.    No follow-ups on file.     Signed By:

## 2024-04-10 NOTE — PROGRESS NOTES
1950: Bedside and Verbal shift change report given to SUSANNE Jolly (oncoming nurse) by SUSANNE Ortiz (offgoing nurse). Report included the following information Nurse Handoff Report, Index, Adult Overview, MAR, and Recent Results.

## 2024-04-10 NOTE — DISCHARGE INSTRUCTIONS
Kidney Stone: Care Instructions  Your Care Instructions     Kidney stones are formed when salts, minerals, and other substances normally found in the urine clump together. They can be as small as grains of sand or, rarely, as large as golf balls.  While the stone is traveling through the ureter, which is the tube that carries urine from the kidney to the bladder, you will probably feel pain. The pain may be mild or very severe. You may also have some blood in your urine. As soon as the stone reaches the bladder, any intense pain should go away.  If a stone is too large to pass on its own, you may need a medical procedure to help you pass the stone.  The doctor has checked you carefully, but problems can develop later. If you notice any problems or new symptoms, get medical treatment right away.  Follow-up care is a key part of your treatment and safety. Be sure to make and go to all appointments, and call your doctor if you are having problems. It's also a good idea to know your test results and keep a list of the medicines you take.  How can you care for yourself at home?  Drink plenty of fluids. If you have kidney, heart, or liver disease and have to limit fluids, talk with your doctor before you increase the amount of fluids you drink.  Take pain medicines exactly as directed. Call your doctor if you think you are having a problem with your medicine.  If the doctor gave you a prescription medicine for pain, take it as prescribed.  If you are not taking a prescription pain medicine, ask your doctor if you can take an over-the-counter medicine. Read and follow all instructions on the label.  Your doctor may ask you to strain your urine so that you can collect your kidney stone when it passes. You can use a kitchen strainer or a tea strainer to catch the stone. Store it in a plastic bag until you see your doctor again.  Preventing future kidney stones  Some changes in your diet may help prevent kidney stones.

## 2024-04-11 ENCOUNTER — HOSPITAL ENCOUNTER (OUTPATIENT)
Facility: HOSPITAL | Age: 34
Setting detail: OBSERVATION
Discharge: HOME | End: 2024-04-14
Attending: OBSTETRICS & GYNECOLOGY | Admitting: OBSTETRICS & GYNECOLOGY
Payer: COMMERCIAL

## 2024-04-11 DIAGNOSIS — N20.0 KIDNEY STONE COMPLICATING PREGNANCY, THIRD TRIMESTER: Primary | ICD-10-CM

## 2024-04-11 DIAGNOSIS — O26.833 KIDNEY STONE COMPLICATING PREGNANCY, THIRD TRIMESTER: Primary | ICD-10-CM

## 2024-04-11 PROBLEM — O99.891 KIDNEY STONE COMPLICATING PREGNANCY, THIRD TRIMESTER: Status: ACTIVE | Noted: 2024-04-11

## 2024-04-11 LAB
COMMENT:: NORMAL
SPECIMEN HOLD: NORMAL

## 2024-04-11 PROCEDURE — 6360000002 HC RX W HCPCS: Performed by: OBSTETRICS & GYNECOLOGY

## 2024-04-11 PROCEDURE — 4500000002 HC ER NO CHARGE

## 2024-04-11 PROCEDURE — 96361 HYDRATE IV INFUSION ADD-ON: CPT

## 2024-04-11 PROCEDURE — 59025 FETAL NON-STRESS TEST: CPT

## 2024-04-11 PROCEDURE — 2580000003 HC RX 258: Performed by: OBSTETRICS & GYNECOLOGY

## 2024-04-11 PROCEDURE — G0378 HOSPITAL OBSERVATION PER HR: HCPCS

## 2024-04-11 PROCEDURE — 99231 SBSQ HOSP IP/OBS SF/LOW 25: CPT | Performed by: OBSTETRICS & GYNECOLOGY

## 2024-04-11 PROCEDURE — 6370000000 HC RX 637 (ALT 250 FOR IP): Performed by: OBSTETRICS & GYNECOLOGY

## 2024-04-11 PROCEDURE — 87086 URINE CULTURE/COLONY COUNT: CPT

## 2024-04-11 PROCEDURE — 96374 THER/PROPH/DIAG INJ IV PUSH: CPT

## 2024-04-11 PROCEDURE — 99285 EMERGENCY DEPT VISIT HI MDM: CPT

## 2024-04-11 RX ORDER — METRONIDAZOLE 250 MG/1
500 TABLET ORAL 2 TIMES DAILY
Status: DISCONTINUED | OUTPATIENT
Start: 2024-04-11 | End: 2024-04-14 | Stop reason: HOSPADM

## 2024-04-11 RX ORDER — OXYCODONE HYDROCHLORIDE 5 MG/1
5 TABLET ORAL EVERY 4 HOURS PRN
Status: DISCONTINUED | OUTPATIENT
Start: 2024-04-11 | End: 2024-04-14 | Stop reason: HOSPADM

## 2024-04-11 RX ORDER — HYDROMORPHONE HYDROCHLORIDE 1 MG/ML
1 INJECTION, SOLUTION INTRAMUSCULAR; INTRAVENOUS; SUBCUTANEOUS EVERY 4 HOURS PRN
Status: DISCONTINUED | OUTPATIENT
Start: 2024-04-11 | End: 2024-04-13 | Stop reason: SDUPTHER

## 2024-04-11 RX ORDER — HYDROMORPHONE HYDROCHLORIDE 1 MG/ML
1 INJECTION, SOLUTION INTRAMUSCULAR; INTRAVENOUS; SUBCUTANEOUS EVERY 4 HOURS PRN
Status: DISCONTINUED | OUTPATIENT
Start: 2024-04-11 | End: 2024-04-13

## 2024-04-11 RX ORDER — ONDANSETRON 2 MG/ML
4 INJECTION INTRAMUSCULAR; INTRAVENOUS EVERY 6 HOURS PRN
Status: DISCONTINUED | OUTPATIENT
Start: 2024-04-11 | End: 2024-04-14 | Stop reason: HOSPADM

## 2024-04-11 RX ORDER — ONDANSETRON 4 MG/1
4 TABLET, ORALLY DISINTEGRATING ORAL EVERY 8 HOURS PRN
Status: DISCONTINUED | OUTPATIENT
Start: 2024-04-11 | End: 2024-04-14 | Stop reason: HOSPADM

## 2024-04-11 RX ORDER — SODIUM CHLORIDE, SODIUM LACTATE, POTASSIUM CHLORIDE, CALCIUM CHLORIDE 600; 310; 30; 20 MG/100ML; MG/100ML; MG/100ML; MG/100ML
INJECTION, SOLUTION INTRAVENOUS CONTINUOUS
Status: DISCONTINUED | OUTPATIENT
Start: 2024-04-11 | End: 2024-04-14 | Stop reason: HOSPADM

## 2024-04-11 RX ORDER — ACETAMINOPHEN 500 MG
1000 TABLET ORAL EVERY 8 HOURS SCHEDULED
Status: DISCONTINUED | OUTPATIENT
Start: 2024-04-11 | End: 2024-04-12

## 2024-04-11 RX ORDER — SODIUM CHLORIDE, SODIUM LACTATE, POTASSIUM CHLORIDE, AND CALCIUM CHLORIDE .6; .31; .03; .02 G/100ML; G/100ML; G/100ML; G/100ML
1000 INJECTION, SOLUTION INTRAVENOUS ONCE
Status: COMPLETED | OUTPATIENT
Start: 2024-04-11 | End: 2024-04-11

## 2024-04-11 RX ADMIN — ACETAMINOPHEN 1000 MG: 500 TABLET ORAL at 08:09

## 2024-04-11 RX ADMIN — SODIUM CHLORIDE, POTASSIUM CHLORIDE, SODIUM LACTATE AND CALCIUM CHLORIDE 1000 ML: 600; 310; 30; 20 INJECTION, SOLUTION INTRAVENOUS at 02:26

## 2024-04-11 RX ADMIN — SODIUM CHLORIDE, POTASSIUM CHLORIDE, SODIUM LACTATE AND CALCIUM CHLORIDE: 600; 310; 30; 20 INJECTION, SOLUTION INTRAVENOUS at 08:11

## 2024-04-11 RX ADMIN — HYDROMORPHONE HYDROCHLORIDE 1 MG: 1 INJECTION, SOLUTION INTRAMUSCULAR; INTRAVENOUS; SUBCUTANEOUS at 02:27

## 2024-04-11 RX ADMIN — METRONIDAZOLE 500 MG: 250 TABLET ORAL at 13:33

## 2024-04-11 RX ADMIN — SODIUM CHLORIDE, POTASSIUM CHLORIDE, SODIUM LACTATE AND CALCIUM CHLORIDE: 600; 310; 30; 20 INJECTION, SOLUTION INTRAVENOUS at 17:01

## 2024-04-11 RX ADMIN — ACETAMINOPHEN 1000 MG: 500 TABLET ORAL at 16:38

## 2024-04-11 ASSESSMENT — PAIN SCALES - GENERAL
PAINLEVEL_OUTOF10: 3
PAINLEVEL_OUTOF10: 2

## 2024-04-11 ASSESSMENT — PAIN DESCRIPTION - LOCATION
LOCATION: FLANK
LOCATION: BACK

## 2024-04-11 ASSESSMENT — PAIN DESCRIPTION - DESCRIPTORS: DESCRIPTORS: SPASM

## 2024-04-11 ASSESSMENT — PAIN DESCRIPTION - ORIENTATION
ORIENTATION: LEFT;LOWER
ORIENTATION: LEFT

## 2024-04-11 NOTE — ED PROVIDER NOTES
Lower left side        No past medical history on file.    Past Surgical History:   Procedure Laterality Date   • CARPAL TUNNEL RELEASE Right    • ORTHOPEDIC SURGERY           Family History   Problem Relation Age of Onset   • Thyroid Disease Mother    • No Known Problems Paternal Grandfather    • Colon Cancer Maternal Grandfather    • Breast Cancer Maternal Grandmother        Social History     Socioeconomic History   • Marital status:      Spouse name: Not on file   • Number of children: Not on file   • Years of education: Not on file   • Highest education level: Not on file   Occupational History   • Not on file   Tobacco Use   • Smoking status: Never   • Smokeless tobacco: Never   Substance and Sexual Activity   • Alcohol use: Not Currently   • Drug use: Never   • Sexual activity: Yes     Partners: Male     Birth control/protection: Pill     Comment: 17 weeks pregnant   Other Topics Concern   • Not on file   Social History Narrative   • Not on file     Social Determinants of Health     Financial Resource Strain: Low Risk  (1/4/2024)    Overall Financial Resource Strain (CARDIA)    • Difficulty of Paying Living Expenses: Not hard at all   Food Insecurity: No Food Insecurity (4/9/2024)    Hunger Vital Sign    • Worried About Running Out of Food in the Last Year: Never true    • Ran Out of Food in the Last Year: Never true   Transportation Needs: No Transportation Needs (4/9/2024)    PRAPARE - Transportation    • Lack of Transportation (Medical): No    • Lack of Transportation (Non-Medical): No   Physical Activity: Not on file   Stress: Not on file   Social Connections: Not on file   Intimate Partner Violence: Not on file   Housing Stability: Low Risk  (4/9/2024)    Housing Stability Vital Sign    • Unable to Pay for Housing in the Last Year: No    • Number of Places Lived in the Last Year: 1    • Unstable Housing in the Last Year: No         ALLERGIES: Penicillins    Review of Systems   Gastrointestinal:

## 2024-04-11 NOTE — H&P
Readmitted from [facility] after <24 hour discharge with return of left sided pain.  --admit for obs to [facility]  --IV dilaudid for severe pain, PO tre for mod pain  --continue IV hydration  --daily NST, reassuring tracing but not reactive on admit, effects of IV dilaudid    Dc is a 34yo  @ 31 3/7 wks who presents to [facility] with CC of Left flank pain with radiation to her left side and some urethral pain.  Pt was seen in [facility] late Monday night/Tuesday morning and admitted for observation.  Her w/u showed left hydronephrosis, large blood in UA, neg Ucx, neg labs, wet prep c/w BV.  After IV hydration and IV dilaudid, pt had an entire evening and overnight not requiring any pain meds.  She was therefore discharged to home yesterday afternoon with no pain meds and instructions to PO hydrate and strain urine.  She has not been straining at home.  She reports hydrating well but with return of severe and intense pain she states she has vomited everything.  No fevers.  She does have sensation of incomplete bladder emptying.  She has had active FM throughout today but currently as she is distracted by her pain she hasn't felt much movement.  No LOF, no VB.  She has not been feeling distinct contractions.        Prenatal care:   Primary Provider: Lenora WAGNER  EDC by LMP=9 wk      Pregnancy problems:  none     IOB labs: O pos, normal, neg urine cult, neg GC/Chl  Genetic Screening: NIPT low risk GIRL!, 4 panel carrier screening neg. MSAFP neg  Anatomy: GIRL!! Anterior placenta, normal anatomy  Flu: received  COVID booster: received   TDAP: given 3/22  RSV vaccine:  Third Tri Labs: Hgb 11.8, glucola 76  GBS:     Pain mgmt. 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        The history is provided by the patient, medical records and the spouse.   Abdominal Pain  This is a recurrent problem. The current episode started less than 1 hour ago. The problem occurs constantly. The problem

## 2024-04-12 ENCOUNTER — APPOINTMENT (OUTPATIENT)
Facility: HOSPITAL | Age: 34
End: 2024-04-12
Payer: COMMERCIAL

## 2024-04-12 LAB
BACTERIA SPEC CULT: NORMAL
SERVICE CMNT-IMP: NORMAL

## 2024-04-12 PROCEDURE — 59025 FETAL NON-STRESS TEST: CPT | Performed by: OBSTETRICS & GYNECOLOGY

## 2024-04-12 PROCEDURE — 59025 FETAL NON-STRESS TEST: CPT

## 2024-04-12 PROCEDURE — 96361 HYDRATE IV INFUSION ADD-ON: CPT

## 2024-04-12 PROCEDURE — G0378 HOSPITAL OBSERVATION PER HR: HCPCS

## 2024-04-12 PROCEDURE — 6370000000 HC RX 637 (ALT 250 FOR IP): Performed by: OBSTETRICS & GYNECOLOGY

## 2024-04-12 PROCEDURE — 76770 US EXAM ABDO BACK WALL COMP: CPT

## 2024-04-12 PROCEDURE — 2580000003 HC RX 258: Performed by: OBSTETRICS & GYNECOLOGY

## 2024-04-12 PROCEDURE — 99232 SBSQ HOSP IP/OBS MODERATE 35: CPT | Performed by: OBSTETRICS & GYNECOLOGY

## 2024-04-12 RX ORDER — ACETAMINOPHEN 500 MG
1000 TABLET ORAL EVERY 8 HOURS PRN
Status: DISCONTINUED | OUTPATIENT
Start: 2024-04-12 | End: 2024-04-14 | Stop reason: HOSPADM

## 2024-04-12 RX ORDER — HYDROMORPHONE HYDROCHLORIDE 4 MG/1
4 TABLET ORAL EVERY 4 HOURS PRN
Status: DISCONTINUED | OUTPATIENT
Start: 2024-04-12 | End: 2024-04-14 | Stop reason: HOSPADM

## 2024-04-12 RX ADMIN — METRONIDAZOLE 500 MG: 250 TABLET ORAL at 09:08

## 2024-04-12 RX ADMIN — METRONIDAZOLE 500 MG: 250 TABLET ORAL at 00:12

## 2024-04-12 RX ADMIN — SODIUM CHLORIDE, POTASSIUM CHLORIDE, SODIUM LACTATE AND CALCIUM CHLORIDE: 600; 310; 30; 20 INJECTION, SOLUTION INTRAVENOUS at 00:16

## 2024-04-12 RX ADMIN — ACETAMINOPHEN 1000 MG: 500 TABLET ORAL at 09:08

## 2024-04-12 RX ADMIN — METRONIDAZOLE 500 MG: 250 TABLET ORAL at 20:47

## 2024-04-12 RX ADMIN — ACETAMINOPHEN 1000 MG: 500 TABLET ORAL at 00:12

## 2024-04-12 ASSESSMENT — PAIN DESCRIPTION - DESCRIPTORS: DESCRIPTORS: ACHING;SPASM

## 2024-04-12 ASSESSMENT — PAIN SCALES - GENERAL: PAINLEVEL_OUTOF10: 4

## 2024-04-12 ASSESSMENT — PAIN DESCRIPTION - LOCATION: LOCATION: BACK

## 2024-04-13 PROCEDURE — 96376 TX/PRO/DX INJ SAME DRUG ADON: CPT

## 2024-04-13 PROCEDURE — 96361 HYDRATE IV INFUSION ADD-ON: CPT

## 2024-04-13 PROCEDURE — 6370000000 HC RX 637 (ALT 250 FOR IP): Performed by: OBSTETRICS & GYNECOLOGY

## 2024-04-13 PROCEDURE — G0378 HOSPITAL OBSERVATION PER HR: HCPCS

## 2024-04-13 PROCEDURE — 99232 SBSQ HOSP IP/OBS MODERATE 35: CPT | Performed by: OBSTETRICS & GYNECOLOGY

## 2024-04-13 PROCEDURE — 59025 FETAL NON-STRESS TEST: CPT

## 2024-04-13 PROCEDURE — 6360000002 HC RX W HCPCS: Performed by: OBSTETRICS & GYNECOLOGY

## 2024-04-13 RX ORDER — HYDROMORPHONE HYDROCHLORIDE 1 MG/ML
1 INJECTION, SOLUTION INTRAMUSCULAR; INTRAVENOUS; SUBCUTANEOUS
Status: DISCONTINUED | OUTPATIENT
Start: 2024-04-13 | End: 2024-04-14 | Stop reason: HOSPADM

## 2024-04-13 RX ADMIN — HYDROMORPHONE HYDROCHLORIDE 1 MG: 1 INJECTION, SOLUTION INTRAMUSCULAR; INTRAVENOUS; SUBCUTANEOUS at 04:51

## 2024-04-13 RX ADMIN — HYDROMORPHONE HYDROCHLORIDE 4 MG: 4 TABLET ORAL at 03:13

## 2024-04-13 RX ADMIN — ONDANSETRON 4 MG: 4 TABLET, ORALLY DISINTEGRATING ORAL at 04:30

## 2024-04-13 RX ADMIN — ACETAMINOPHEN 1000 MG: 500 TABLET ORAL at 04:32

## 2024-04-13 RX ADMIN — HYDROMORPHONE HYDROCHLORIDE 1 MG: 1 INJECTION, SOLUTION INTRAMUSCULAR; INTRAVENOUS; SUBCUTANEOUS at 09:00

## 2024-04-13 RX ADMIN — METRONIDAZOLE 500 MG: 250 TABLET ORAL at 20:48

## 2024-04-13 RX ADMIN — METRONIDAZOLE 500 MG: 250 TABLET ORAL at 09:41

## 2024-04-13 ASSESSMENT — PAIN DESCRIPTION - DESCRIPTORS
DESCRIPTORS: ACHING

## 2024-04-13 ASSESSMENT — PAIN DESCRIPTION - LOCATION
LOCATION: BACK
LOCATION: BACK
LOCATION: ABDOMEN;BACK
LOCATION: BACK
LOCATION: FLANK

## 2024-04-13 ASSESSMENT — PAIN DESCRIPTION - ORIENTATION
ORIENTATION: LEFT
ORIENTATION: LEFT;LOWER
ORIENTATION: LEFT

## 2024-04-13 ASSESSMENT — PAIN SCALES - GENERAL
PAINLEVEL_OUTOF10: 6
PAINLEVEL_OUTOF10: 9
PAINLEVEL_OUTOF10: 4
PAINLEVEL_OUTOF10: 7

## 2024-04-13 NOTE — CONSULTS
Urology Consult      Principal Problem:    Kidney stone complicating pregnancy, third trimester  Resolved Problems:    * No resolved hospital problems. *      Admitting MD (and procedure) = Ni Cooley MD -    Primary care MD: Miky Bear MD  - [phone]  Code state: Full Code    ____________________________________________________________________________  ASSESSMENT/PLAN:   33-year-old female with 32-week gestation.  Having intermittent flank tenderness left side.  She was admitted previously and is now readmitted with pain.  Ultrasound suggest nonobstructing left lower pole stone.  Patient denies prior history of stones.  More recent ultrasound shows bilateral mild to moderate hydronephrosis suggested physiologic.  Patient and spouse are understandably frustrated with recurrent pain that is refractory to oral pain medication regimen currently.  Understanding their wish to go home, currently it is best to keep the patient under observation with pain control.  Lengthy discussion regarding current status and potential treatment options.  Currently no acute indications for nephrostomy tube placement or ureteral stent.  The option of CT scan noncontrast was brought up.  Limited clinical information regarding potential risks to fetus are noted.  Currently no acute indication for CT.  Recommend continued attempts at oral pain control and management.  Continued hydration.  Monitor.  Difficulty with nephrostomy tube or stent placement as patient is often continue to have pain and discomfort.  Additionally there are significant risk factors to both procedures.  Additionally we would have to consider doing unilateral placement initially or do bilateral with increased risk factors.  Interventions planned -currently observation.  If acute decompensation consider either nephrostomy tube placement or stent placement.  Diet plan-patient may have regular diet unless clinical observable

## 2024-04-14 VITALS
SYSTOLIC BLOOD PRESSURE: 102 MMHG | OXYGEN SATURATION: 99 % | HEART RATE: 79 BPM | RESPIRATION RATE: 16 BRPM | TEMPERATURE: 98.2 F | DIASTOLIC BLOOD PRESSURE: 66 MMHG

## 2024-04-14 LAB
ALBUMIN SERPL-MCNC: 2.2 G/DL (ref 3.5–5)
ALBUMIN/GLOB SERPL: 0.6 (ref 1.1–2.2)
ALP SERPL-CCNC: 64 U/L (ref 45–117)
ALT SERPL-CCNC: 19 U/L (ref 12–78)
ANION GAP SERPL CALC-SCNC: 5 MMOL/L (ref 5–15)
AST SERPL-CCNC: 18 U/L (ref 15–37)
BASOPHILS # BLD: 0.1 K/UL (ref 0–0.1)
BASOPHILS NFR BLD: 0 % (ref 0–1)
BILIRUB SERPL-MCNC: 0.2 MG/DL (ref 0.2–1)
BUN SERPL-MCNC: 10 MG/DL (ref 6–20)
BUN/CREAT SERPL: 21 (ref 12–20)
CALCIUM SERPL-MCNC: 8.4 MG/DL (ref 8.5–10.1)
CHLORIDE SERPL-SCNC: 111 MMOL/L (ref 97–108)
CO2 SERPL-SCNC: 24 MMOL/L (ref 21–32)
CREAT SERPL-MCNC: 0.48 MG/DL (ref 0.55–1.02)
DIFFERENTIAL METHOD BLD: ABNORMAL
EOSINOPHIL # BLD: 0.3 K/UL (ref 0–0.4)
EOSINOPHIL NFR BLD: 2 % (ref 0–7)
ERYTHROCYTE [DISTWIDTH] IN BLOOD BY AUTOMATED COUNT: 12.4 % (ref 11.5–14.5)
GLOBULIN SER CALC-MCNC: 3.5 G/DL (ref 2–4)
GLUCOSE SERPL-MCNC: 90 MG/DL (ref 65–100)
HCT VFR BLD AUTO: 30.5 % (ref 35–47)
HGB BLD-MCNC: 10.5 G/DL (ref 11.5–16)
IMM GRANULOCYTES # BLD AUTO: 0.1 K/UL (ref 0–0.04)
IMM GRANULOCYTES NFR BLD AUTO: 1 % (ref 0–0.5)
LYMPHOCYTES # BLD: 2.7 K/UL (ref 0.8–3.5)
LYMPHOCYTES NFR BLD: 23 % (ref 12–49)
MCH RBC QN AUTO: 31.9 PG (ref 26–34)
MCHC RBC AUTO-ENTMCNC: 34.4 G/DL (ref 30–36.5)
MCV RBC AUTO: 92.7 FL (ref 80–99)
MONOCYTES # BLD: 0.8 K/UL (ref 0–1)
MONOCYTES NFR BLD: 7 % (ref 5–13)
NEUTS SEG # BLD: 7.7 K/UL (ref 1.8–8)
NEUTS SEG NFR BLD: 67 % (ref 32–75)
NRBC # BLD: 0 K/UL (ref 0–0.01)
NRBC BLD-RTO: 0 PER 100 WBC
PLATELET # BLD AUTO: 212 K/UL (ref 150–400)
PMV BLD AUTO: 10.7 FL (ref 8.9–12.9)
POTASSIUM SERPL-SCNC: 3.9 MMOL/L (ref 3.5–5.1)
PROT SERPL-MCNC: 5.7 G/DL (ref 6.4–8.2)
RBC # BLD AUTO: 3.29 M/UL (ref 3.8–5.2)
SODIUM SERPL-SCNC: 140 MMOL/L (ref 136–145)
WBC # BLD AUTO: 11.7 K/UL (ref 3.6–11)

## 2024-04-14 PROCEDURE — 99238 HOSP IP/OBS DSCHRG MGMT 30/<: CPT | Performed by: OBSTETRICS & GYNECOLOGY

## 2024-04-14 PROCEDURE — G0378 HOSPITAL OBSERVATION PER HR: HCPCS

## 2024-04-14 PROCEDURE — 80053 COMPREHEN METABOLIC PANEL: CPT

## 2024-04-14 PROCEDURE — 59025 FETAL NON-STRESS TEST: CPT

## 2024-04-14 PROCEDURE — 36415 COLL VENOUS BLD VENIPUNCTURE: CPT

## 2024-04-14 PROCEDURE — 6370000000 HC RX 637 (ALT 250 FOR IP): Performed by: OBSTETRICS & GYNECOLOGY

## 2024-04-14 PROCEDURE — 96361 HYDRATE IV INFUSION ADD-ON: CPT

## 2024-04-14 PROCEDURE — 85025 COMPLETE CBC W/AUTO DIFF WBC: CPT

## 2024-04-14 RX ORDER — OXYCODONE HYDROCHLORIDE 5 MG/1
5 TABLET ORAL EVERY 4 HOURS PRN
Qty: 20 TABLET | Refills: 0 | Status: ON HOLD | OUTPATIENT
Start: 2024-04-14 | End: 2024-04-18 | Stop reason: HOSPADM

## 2024-04-14 RX ORDER — ONDANSETRON 4 MG/1
4 TABLET, ORALLY DISINTEGRATING ORAL EVERY 8 HOURS PRN
Qty: 30 TABLET | Refills: 0 | Status: SHIPPED | OUTPATIENT
Start: 2024-04-14

## 2024-04-14 RX ADMIN — METRONIDAZOLE 500 MG: 250 TABLET ORAL at 09:01

## 2024-04-14 NOTE — DISCHARGE SUMMARY
Obstetrical Discharge Summary     Name: Dc Eduardo MRN: [id number]  SSN: [id number]    YOB: 1990  Age: 33 y.o.  Sex: female      Admit Date: 2024    Discharge Date: 2024     Admitting Physician: Elicia Rowland MD     Attending Physician:  Ni Cooley MD     Admission Diagnoses: Kidney stone complicating pregnancy, third trimester [O26.833, N20.0]    Discharge Diagnoses:   This patient has no babies on file.     Additional Diagnoses:  No components found for: \"OBEXTABORH\", \"OBEXTABSCRN\", \"OBEXTRUBELLA\", \"OBEXTGRBS\"    Hospital Course:   Dc Eduardo is a 33 y.o.  31w6d who presented with left back and abdominal pain. She was admitted from  to 4/10 and discharged home after 24 hours without IV pain medication requirements. She was then readmitted on . She strongly desires discharge home today. Repeat renal ultrasound showed bilateral mild hydronephrosis with left lower pole non obstructing renal stone. Urology was consulted and recommended continued pain management but no surgical or other intervention. Dc was dishcarged home after 24 hours without IV pain medication    Disposition at Discharge: Home or self care    Discharged Condition: Stable    Patient Instructions:   Current Discharge Medication List        START taking these medications    Details   oxyCODONE (ROXICODONE) 5 MG immediate release tablet Take 1 tablet by mouth every 4 hours as needed for Pain for up to 3 days. Max Daily Amount: 30 mg  Qty: 20 tablet, Refills: 0    Comments: Reduce doses taken as pain becomes manageable  Associated Diagnoses: Kidney stone complicating pregnancy, third trimester      ondansetron (ZOFRAN-ODT) 4 MG disintegrating tablet Take 1 tablet by mouth every 8 hours as needed for Nausea or Vomiting  Qty: 30 tablet, Refills: 0           CONTINUE these medications which have NOT CHANGED    Details   metroNIDAZOLE (FLAGYL) 500 MG tablet Take 1 tablet by mouth 2 times daily for 5

## 2024-04-14 NOTE — PROGRESS NOTES
0730- SBAR at bedside with off going RN   
7:35- Bedside and Verbal shift change report given to JADEN Rausch RN  (oncoming nurse) and JADEN Chacko by ADÁN Gifford (offgoing nurse). Report included the following information SBAR, Kardex, Intake/Output, MAR, and Recent Results.       12:30- Sent down urine culture, sent extra yellow top on hold.  
@ 0143 patient arrived to [facility] with CC of abdominal/back pain on lower left side; patient confirms positive fetal movement and denies leakage of fluid, ctx, and vaginal bleeding     @ 0156 Dr. Rowland at bedside assessing patient; POC is to give a liter of fluid and then give Dilaudid IV     @ 0315 POC is to transfer to [facility] overnight     @ 0333 Bedside and Verbal shift change report given to Tami SKINNER (oncoming nurse) by ARTURO Anders RN (offgoing nurse). Report included the following information Nurse Handoff Report, Adult Overview, Intake/Output, MAR, Recent Results, and Event Log.     
Antepartum Progress Note    S:  Dc did well overnight, has not required any doses of IV dilaudid yesterday during the day or last night.  Reports some pain in the left mid back /left flank region.  Currently feels like she can completely empty her bladder. Noticing some possible small sediment but no stones. Baby very active. Denies fevers/chills.    O:  Patient Vitals for the past 24 hrs:   BP Temp Temp src Pulse Resp SpO2   04/12/24 0759 98/73 98.4 °F (36.9 °C) Oral 74 16 100 %   04/12/24 0015 97/60 98.4 °F (36.9 °C) Oral 75 16 94 %   04/11/24 2022 104/70 98.1 °F (36.7 °C) Oral 67 18 100 %   04/11/24 1615 104/65 98.2 °F (36.8 °C) Oral 77 16 100 %   04/11/24 0823 100/64 98.4 °F (36.9 °C) Oral 75 16 100 %       Gen - A&O, NAD, resting comfortably  HEENT - normocephalic  Resp - non-labored  CV - heart rate wnl  Abd - soft, nt, nd, gravid, no rebound or guarding, some mild tenderness to palpation in the left CVA region, no right CVAT   - deferred  Ext - no edema bilaterally    4/12/24 NST:   Indication: suspected kidney stone  FHTs: baseline 150, moderate variability, +accels, no decels  [place]: no ctx  Time: 20 min  Interpretation: reactive cat 1 tracing    Last 3 CMP:   No results for input(s): \"NA\", \"K\", \"CL\", \"CO2\", \"BUN\", \"CREATININE\", \"GLUCOSE\", \"CALCIUM\", \"PROT\", \"LABALBU\", \"BILITOT\", \"ALKPHOS\", \"AST\", \"ALT\" in the last 72 hours.     Last 3 CBC:   No results for input(s): \"WBC\", \"RBC\", \"HGB\", \"HCT\", \"MCV\", \"MCH\", \"MCHC\", \"RDW\", \"PLT\", \"MPV\" in the last 72 hours.     Urin analysis:   No results for input(s): \"COLORU\", \"CLARITYU\", \"SPECGRAV\", \"PHUR\", \"PROTEINU\", \"GLUCOSEU\", \"KETUA\", \"BILIRUBINUR\", \"NITRU\", \"LEUKOCYTESUR\" in the last 72 hours.    Invalid input(s): \"FLOODU\"    A/P:  33 y.o. G1 at 31w4d, readmitted for renal colic, suspected kidney stone.  - recheck another renal ultrasound  - repeat urine culture pending  - IVFs  - continue to strain urine  - dilaudid IV prn severe pain, Sarah for moderate pain  - 
Antepartum Progress Note    S:  Dc feels improved this morning. No dilaudid required overnight. Pain is moderate this morning. Pain is all on left side wrapping around to the front    Still feels like she can completely empty her bladder. Noticing some possible small sediment but no stones. Baby very active. Denies fevers/chills.    Strongly desires discharge home this AM.    O:  Patient Vitals for the past 24 hrs:   BP Temp Temp src Pulse Resp SpO2   04/14/24 0127 (!) 98/59 98.2 °F (36.8 °C) Oral 84 16 99 %   04/13/24 1945 96/60 99 °F (37.2 °C) Oral 87 16 100 %   04/13/24 1548 94/64 97.4 °F (36.3 °C) Oral 65 16 99 %     Gen - A&O, NAD, resting comfortably  HEENT - normocephalic  Resp - non-labored  CV - heart rate wnl  Abd - soft, nt, nd, gravid, no rebound or guarding, some moderate tenderness to palpation in the left CVA region, no right CVAT   - deferred  Ext - no edema bilaterally    4/13/24 NST- AM 4/14 NST pending:   Indication: suspected kidney stone  FHTs: baseline 150, moderate variability, +accels, no decels  [place]: no ctx  Time: 20 min  Interpretation: reactive cat 1 tracing    Last 3 CMP:   Recent Labs     04/14/24  0622      K 3.9   *   CO2 24   BUN 10   CREATININE 0.48*   GLUCOSE 90   CALCIUM 8.4*   PROT 5.7*   LABALBU 2.2*   BILITOT 0.2   ALKPHOS 64   AST 18   ALT 19        Last 3 CBC:   Recent Labs     04/14/24  0622   WBC 11.7*   RBC 3.29*   HGB 10.5*   HCT 30.5*   MCV 92.7   MCH 31.9   MCHC 34.4   RDW 12.4      MPV 10.7        Urin analysis:   No results for input(s): \"COLORU\", \"CLARITYU\", \"SPECGRAV\", \"PHUR\", \"PROTEINU\", \"GLUCOSEU\", \"KETUA\", \"BILIRUBINUR\", \"NITRU\", \"LEUKOCYTESUR\" in the last 72 hours.    Invalid input(s): \"FLOODU\"    Imaging 4/11/24  US Retroperitoneum  FINDINGS:  Mild right and mild to moderate left hydronephrosis is likely physiologic given  gravid uterus. A 7 mm calculus in the left lower pole is nonobstructing. No  other shadowing urinary calculi 
Antepartum Progress Note    S:  Pt doing well this morning. No flank pain at present, currently comfortable with IVFs and pain medications. Baby very active. No VB or LOF or ctx. Some mild dysuria. No fevers/chills, otherwise feeling well. Pt is straining urine.    O:  Patient Vitals for the past 24 hrs:   BP Temp Temp src Pulse Resp SpO2   04/11/24 0823 100/64 98.4 °F (36.9 °C) Oral 75 16 100 %   04/11/24 0340 96/64 98.3 °F (36.8 °C) Oral 75 16 99 %   04/11/24 0143 122/70 98.3 °F (36.8 °C) Oral (!) 110 16 --     Gen - A&O, NAD, resting comfortably  HEENT - normocephalic  Resp - non-labored  CV - heart rate wnl  Abd - soft, nt, nd, gravid, no rebound or guarding, no CVAT or flank tenderness on exam   - deferred  Ext - no edema bilaterally    NST:   Indication: suspected kidney stone  FHTs: baseline 140s, moderate variability, +accels, no decels  [place]: no ctx  Time: 20 min  Interpretation: reactive cat 1 tracing    Last 3 CMP:   Recent Labs     04/09/24  0304      K 3.8      CO2 25   BUN 11   CREATININE 0.54*   GLUCOSE 85   CALCIUM 8.8   PROT 6.4   LABALBU 2.7*   BILITOT 0.4   ALKPHOS 80   AST 14*   ALT 16      Last 3 CBC:   Recent Labs     04/09/24  0304   WBC 12.4*   RBC 3.59*   HGB 11.2*   HCT 33.7*   MCV 93.9   MCH 31.2   MCHC 33.2   RDW 11.9      MPV 10.8      Urin analysis:   Recent Labs     04/09/24  0219   COLORU YELLOW/STRAW   SPECGRAV 1.018   PROTEINU 30*   GLUCOSEU Negative   KETUA TRACE*   BILIRUBINUR Negative   NITRU Negative   LEUKOCYTESUR SMALL*     A/P:  33 y.o. G1 at 31w3d now admitted for suspected kidney stone.    -IVFs  -continue to strain urine  -dilaudid IV prn severe pain, Sarah for moderate pain  -daily NST  -continued observation on [facility] at this time    Silvia Juarez MD  4/11/2024  10:13 AM     
Bedside and Verbal shift change report given to CASA Espinosa RN (oncoming nurse) by Lynn Uriostegui RN (offgoing nurse). Report included the following information Nurse Handoff Report, Intake/Output, MAR, and Recent Results.    I have reviewed the provider's instructions with the patient, answering all questions to her satisfaction.   
Bedside shift change report given to Mo,RN (oncoming nurse) by Dilan Francisco (offgoing nurse). Report included the following information Index, Adult Overview, and MAR.    
Dr Johnson in with orders, consult to urology called, Dr Benitez seen pt.  
Pt given given dilaudid 4mg by mouth at 0313 for pain level 6. Pt called out at 0445 for a pain level of 10. Dr. Carmen notified and stated it was ok to give Dilaudid 1mg iv now x1.  
Pt requesting FHTs. NST ordered daily.  Pt has no complaints at this time but has been done on previous night shifts.  .  
Repeat urine culture returned negative.     Renal ultrasound performed but read is pending.    She still feels some left back pain.    Recommend inpatient stay tonight so that we can monitor her pain.   If no improvement by tomorrow, recommend Urology consult.     Ni Cooley MD    
Urology Progress Note        Principal Problem:    Kidney stone complicating pregnancy, third trimester  Resolved Problems:    * No resolved hospital problems. *    ______________________________________________________  ASSESSMENT/PLAN:   Improved pain control with positional changes etc.  Patient is okay for discharge.  Discussed with Dr. Johnson by [employer] communication.    Follow-up as needed outpatient or inpatient with recurrent pain or other issue.  Future plans and concerns in the setting of plans for future pregnancy are addressed and noted.    ______________________________________________________    Patient: Dc Eduardo MRN: [id number]  SSN: [id number]    YOB: 1990 Age: 33 y.o.  Sex: female   Height:   Weight:   BMI: There is no height or weight on file to calculate BMI.   PCP: Miky Bear [phone]   Contact:  Primary Emergency Contact: Saleem Maor, Home Phone: [phone]     Hospital Day: 4 by Admitted 2024  1:39 AM by Ni Cooley MD     * Surgery not found * * No surgery found *          SUBJECTIVE:  * No surgery found *   Patient's night was better.  Slept more upright and felt that pain was very well-controlled without persistent difficulty.  Denies fevers or other acute pathologies.    OBJECTIVE:  /66   Pulse 79   Temp 98.2 °F (36.8 °C) (Oral)   Resp 16   LMP 2023 (Exact Date)   SpO2 99%    Temp (24hrs), Av.2 °F (36.8 °C), Min:97.4 °F (36.3 °C), Max:99 °F (37.2 °C)    Intake and Output:  ADULT DIET; Regular   1901 -  0700  In: 775 [P.O.:775]  Out: 700 [Urine:700]  No intake/output data recorded.    Physical Exam:   Alert and oriented.  Well-appearing in no acute distress.  Smiling and more comfortable sitting upright.    Anti-infectives Orders (From now, onward)      Start     Dose/Rate Route Frequency Ordered Stop    24 1330  metroNIDAZOLE (FLAGYL) tablet 500 mg        Expiring   Question:  Antimicrobial 
lower pole is nonobstructing. No  other shadowing urinary calculi shown.  RIGHT KIDNEY: measures 12.6 cm in length.  LEFT KIDNEY: measures 12.5 cm in length.  AORTA: Normal caliber in its visualized portions.     COMMON ILIAC ARTERIES: Obscured by gravid uterus.  IVC: Normal caliber in its visualized portions.  BLADDER: Incompletely distended.     IMPRESSION:  Mild to moderate bilateral hydronephrosis is likely physiologic given gravid  uterus. Nonobstructing left lower pole calculus.    A/P:  33 y.o. G1 at 31w5d, readmitted for left CVA tenderness  - repeat renal ultrasound demonstrating mild bilateral hydronephrosis and 7 mm non obstructing left renal stone in the left lower pole  - repeat urine culture: no growth  - continue to strain urine  - dilaudid IV prn severe pain  - daily NST  - continued observation on [facility] at this time  - consult urology this morning- they do not suspect renal stone is the issue. Recommend continued pain management  - Continuing flagyl for BV treatment (4/9- 4/15)    Ariane Johnson MD  4/13/2024  8:51 AM

## 2024-04-14 NOTE — DISCHARGE INSTRUCTIONS
Continue pain medication as needed    Follow up with Dr. Cooley     Weeks 32 to 34 of Your Pregnancy: Care Instructions    Decide whether you want to bank or donate your baby's umbilical cord blood. If you want to save this blood, you have to arrange for it ahead of time.    Decide about circumcision. Personal, Buddhism, or cultural beliefs may play a role in your decision. You get to decide what you want for your baby.    Learn how to ease hemorrhoids.    Get more liquids, fruits, vegetables, and fiber in your diet.  Avoid sitting for too long.  Clean yourself with moist toilet paper. Or try witch hazel pads.  Try ice packs or warm sitz baths for discomfort.  Use hydrocortisone cream for pain or itching.  Ask your doctor about stool softeners.    Consider the benefits of breastfeeding.    It reduces your baby's risk of sudden infant death syndrome (SIDS).   babies are less likely to get certain infections. And they're less likely to be obese or get diabetes later in life.  It can lower your risk of breast and ovarian cancers and osteoporosis.  It saves you money.  Follow-up care is a key part of your treatment and safety. Be sure to make and go to all appointments, and call your doctor if you are having problems. It's also a good idea to know your test results and keep a list of the medicines you take.  Where can you learn more?  Go to https://www.[employer].net/patientEd and enter X711 to learn more about \"Weeks 32 to 34 of Your Pregnancy: Care Instructions.\"  Current as of: July 10, 2023               Content Version: 14.0  © 7715-1471 [employer]   Care instructions adapted under license by [employer]. If you have questions about a medical condition or this instruction, always ask your healthcare professional. [employer] disclaims any warranty or liability for your use of this information.

## 2024-04-15 ENCOUNTER — TELEPHONE (OUTPATIENT)
Age: 34
End: 2024-04-15

## 2024-04-15 ENCOUNTER — TELEPHONE (OUTPATIENT)
Facility: CLINIC | Age: 34
End: 2024-04-15

## 2024-04-15 NOTE — TELEPHONE ENCOUNTER
Care Transitions Initial Follow Up Call    Outreach made within 2 business days of discharge: Yes    Patient: Dc Eduardo Patient : 1990   MRN: [id number]  Reason for Admission: There are no discharge diagnoses documented for the most recent discharge.  Discharge Date: 24       Spoke with: Patient    Discharge department/facility: [facility] Interactive Patient Contact:  Was patient able to fill all prescriptions: Yes  Was patient instructed to bring all medications to the follow-up visit: Yes  Is patient taking all medications as directed in the discharge summary? Yes  Does patient understand their discharge instructions: Yes  Does patient have questions or concerns that need addressed prior to 7-14 day follow up office visit: no    Scheduled appointment with PCP within 7-14 days    Follow Up  Future Appointments   Date Time Provider Department Center   2024  2:50 PM Ni Cooley MD [facility] AMB   5/3/2024  2:30 PM Ni Cooley MD [facility] AMB   2024  1:20 PM Ni Cooley MD [facility] AMB   2024 10:00 AM Ni Cooley MD [facility] AMB   2024  1:20 PM Ni Cooley MD [facility] AMB   2024  1:20 PM Ni Cooley MD [facility] AM[facility]   2024  1:15 PM Gabi Cabrales MD [facility] AM[facility]   2025  9:00 AM [facility]       Kerline Sales LPN

## 2024-04-16 ENCOUNTER — TELEPHONE (OUTPATIENT)
Age: 34
End: 2024-04-16

## 2024-04-16 ENCOUNTER — HOSPITAL ENCOUNTER (EMERGENCY)
Facility: HOSPITAL | Age: 34
Discharge: HOME | DRG: 818 | End: 2024-04-16
Payer: COMMERCIAL

## 2024-04-16 ENCOUNTER — HOSPITAL ENCOUNTER (INPATIENT)
Facility: HOSPITAL | Age: 34
LOS: 2 days | Discharge: HOME | DRG: 818 | End: 2024-04-18
Attending: OBSTETRICS & GYNECOLOGY | Admitting: OBSTETRICS & GYNECOLOGY
Payer: COMMERCIAL

## 2024-04-16 VITALS
TEMPERATURE: 98 F | SYSTOLIC BLOOD PRESSURE: 110 MMHG | HEART RATE: 84 BPM | DIASTOLIC BLOOD PRESSURE: 68 MMHG | RESPIRATION RATE: 16 BRPM

## 2024-04-16 DIAGNOSIS — O26.833 KIDNEY STONE COMPLICATING PREGNANCY, THIRD TRIMESTER: ICD-10-CM

## 2024-04-16 DIAGNOSIS — N20.0 KIDNEY STONE COMPLICATING PREGNANCY, THIRD TRIMESTER: ICD-10-CM

## 2024-04-16 PROBLEM — N23 RENAL COLIC ON LEFT SIDE: Status: ACTIVE | Noted: 2024-04-16

## 2024-04-16 LAB
ALBUMIN SERPL-MCNC: 2.6 G/DL (ref 3.5–5)
ALBUMIN/GLOB SERPL: 0.7 (ref 1.1–2.2)
ALP SERPL-CCNC: 82 U/L (ref 45–117)
ALT SERPL-CCNC: 36 U/L (ref 12–78)
ANION GAP SERPL CALC-SCNC: 6 MMOL/L (ref 5–15)
AST SERPL-CCNC: 35 U/L (ref 15–37)
BASOPHILS # BLD: 0.1 K/UL (ref 0–0.1)
BASOPHILS NFR BLD: 0 % (ref 0–1)
BILIRUB SERPL-MCNC: 0.7 MG/DL (ref 0.2–1)
BUN SERPL-MCNC: 10 MG/DL (ref 6–20)
BUN/CREAT SERPL: 14 (ref 12–20)
CALCIUM SERPL-MCNC: 9.4 MG/DL (ref 8.5–10.1)
CHLORIDE SERPL-SCNC: 105 MMOL/L (ref 97–108)
CO2 SERPL-SCNC: 23 MMOL/L (ref 21–32)
CREAT SERPL-MCNC: 0.71 MG/DL (ref 0.55–1.02)
DIFFERENTIAL METHOD BLD: ABNORMAL
EOSINOPHIL # BLD: 0 K/UL (ref 0–0.4)
EOSINOPHIL NFR BLD: 0 % (ref 0–7)
ERYTHROCYTE [DISTWIDTH] IN BLOOD BY AUTOMATED COUNT: 12.2 % (ref 11.5–14.5)
GLOBULIN SER CALC-MCNC: 3.5 G/DL (ref 2–4)
GLUCOSE SERPL-MCNC: 85 MG/DL (ref 65–100)
HCT VFR BLD AUTO: 34.5 % (ref 35–47)
HGB BLD-MCNC: 12 G/DL (ref 11.5–16)
IMM GRANULOCYTES # BLD AUTO: 0.1 K/UL (ref 0–0.04)
IMM GRANULOCYTES NFR BLD AUTO: 1 % (ref 0–0.5)
LYMPHOCYTES # BLD: 1.5 K/UL (ref 0.8–3.5)
LYMPHOCYTES NFR BLD: 9 % (ref 12–49)
MCH RBC QN AUTO: 31.5 PG (ref 26–34)
MCHC RBC AUTO-ENTMCNC: 34.8 G/DL (ref 30–36.5)
MCV RBC AUTO: 90.6 FL (ref 80–99)
MONOCYTES # BLD: 0.7 K/UL (ref 0–1)
MONOCYTES NFR BLD: 5 % (ref 5–13)
NEUTS SEG # BLD: 13.5 K/UL (ref 1.8–8)
NEUTS SEG NFR BLD: 85 % (ref 32–75)
NRBC # BLD: 0 K/UL (ref 0–0.01)
NRBC BLD-RTO: 0 PER 100 WBC
PLATELET # BLD AUTO: 247 K/UL (ref 150–400)
PMV BLD AUTO: 10.9 FL (ref 8.9–12.9)
POTASSIUM SERPL-SCNC: 4.1 MMOL/L (ref 3.5–5.1)
PROT SERPL-MCNC: 6.1 G/DL (ref 6.4–8.2)
RBC # BLD AUTO: 3.81 M/UL (ref 3.8–5.2)
SODIUM SERPL-SCNC: 134 MMOL/L (ref 136–145)
WBC # BLD AUTO: 15.9 K/UL (ref 3.6–11)

## 2024-04-16 PROCEDURE — 96376 TX/PRO/DX INJ SAME DRUG ADON: CPT

## 2024-04-16 PROCEDURE — 6370000000 HC RX 637 (ALT 250 FOR IP): Performed by: OBSTETRICS & GYNECOLOGY

## 2024-04-16 PROCEDURE — 85025 COMPLETE CBC W/AUTO DIFF WBC: CPT

## 2024-04-16 PROCEDURE — 2580000003 HC RX 258: Performed by: OBSTETRICS & GYNECOLOGY

## 2024-04-16 PROCEDURE — 80053 COMPREHEN METABOLIC PANEL: CPT

## 2024-04-16 PROCEDURE — 96361 HYDRATE IV INFUSION ADD-ON: CPT

## 2024-04-16 PROCEDURE — 1120000000 HC RM PRIVATE OB

## 2024-04-16 PROCEDURE — 6360000002 HC RX W HCPCS: Performed by: OBSTETRICS & GYNECOLOGY

## 2024-04-16 PROCEDURE — 96372 THER/PROPH/DIAG INJ SC/IM: CPT

## 2024-04-16 PROCEDURE — 96374 THER/PROPH/DIAG INJ IV PUSH: CPT

## 2024-04-16 PROCEDURE — 99222 1ST HOSP IP/OBS MODERATE 55: CPT | Performed by: OBSTETRICS & GYNECOLOGY

## 2024-04-16 PROCEDURE — 36415 COLL VENOUS BLD VENIPUNCTURE: CPT

## 2024-04-16 PROCEDURE — 99285 EMERGENCY DEPT VISIT HI MDM: CPT

## 2024-04-16 PROCEDURE — 96360 HYDRATION IV INFUSION INIT: CPT

## 2024-04-16 PROCEDURE — 4500000002 HC ER NO CHARGE

## 2024-04-16 RX ORDER — HYDROMORPHONE HYDROCHLORIDE 1 MG/ML
1 INJECTION, SOLUTION INTRAMUSCULAR; INTRAVENOUS; SUBCUTANEOUS ONCE
Status: COMPLETED | OUTPATIENT
Start: 2024-04-16 | End: 2024-04-16

## 2024-04-16 RX ORDER — SODIUM CHLORIDE 9 MG/ML
INJECTION, SOLUTION INTRAVENOUS CONTINUOUS
Status: DISCONTINUED | OUTPATIENT
Start: 2024-04-16 | End: 2024-04-18 | Stop reason: HOSPADM

## 2024-04-16 RX ORDER — HYDROMORPHONE HYDROCHLORIDE 2 MG/1
2 TABLET ORAL EVERY 4 HOURS PRN
Status: DISCONTINUED | OUTPATIENT
Start: 2024-04-16 | End: 2024-04-18 | Stop reason: HOSPADM

## 2024-04-16 RX ORDER — HYDROMORPHONE HYDROCHLORIDE 1 MG/ML
1 INJECTION, SOLUTION INTRAMUSCULAR; INTRAVENOUS; SUBCUTANEOUS
Status: DISCONTINUED | OUTPATIENT
Start: 2024-04-16 | End: 2024-04-18 | Stop reason: HOSPADM

## 2024-04-16 RX ORDER — HYDROMORPHONE HYDROCHLORIDE 1 MG/ML
1 INJECTION, SOLUTION INTRAMUSCULAR; INTRAVENOUS; SUBCUTANEOUS EVERY 4 HOURS PRN
Status: DISCONTINUED | OUTPATIENT
Start: 2024-04-16 | End: 2024-04-16 | Stop reason: SDUPTHER

## 2024-04-16 RX ORDER — ONDANSETRON 2 MG/ML
4 INJECTION INTRAMUSCULAR; INTRAVENOUS EVERY 6 HOURS PRN
Status: DISCONTINUED | OUTPATIENT
Start: 2024-04-16 | End: 2024-04-18 | Stop reason: HOSPADM

## 2024-04-16 RX ORDER — HYDROMORPHONE HYDROCHLORIDE 1 MG/ML
1 INJECTION, SOLUTION INTRAMUSCULAR; INTRAVENOUS; SUBCUTANEOUS
Status: COMPLETED | OUTPATIENT
Start: 2024-04-16 | End: 2024-04-16

## 2024-04-16 RX ORDER — HYDROMORPHONE HYDROCHLORIDE 1 MG/ML
1 INJECTION, SOLUTION INTRAMUSCULAR; INTRAVENOUS; SUBCUTANEOUS EVERY 4 HOURS PRN
Status: DISCONTINUED | OUTPATIENT
Start: 2024-04-16 | End: 2024-04-16 | Stop reason: HOSPADM

## 2024-04-16 RX ORDER — HYDROMORPHONE HYDROCHLORIDE 1 MG/ML
0.5 INJECTION, SOLUTION INTRAMUSCULAR; INTRAVENOUS; SUBCUTANEOUS
Status: COMPLETED | OUTPATIENT
Start: 2024-04-16 | End: 2024-04-16

## 2024-04-16 RX ORDER — ACETAMINOPHEN 325 MG/1
650 TABLET ORAL EVERY 4 HOURS PRN
Status: DISCONTINUED | OUTPATIENT
Start: 2024-04-16 | End: 2024-04-18 | Stop reason: HOSPADM

## 2024-04-16 RX ORDER — 0.9 % SODIUM CHLORIDE 0.9 %
1000 INTRAVENOUS SOLUTION INTRAVENOUS ONCE
Status: COMPLETED | OUTPATIENT
Start: 2024-04-16 | End: 2024-04-16

## 2024-04-16 RX ORDER — SODIUM CHLORIDE, SODIUM LACTATE, POTASSIUM CHLORIDE, AND CALCIUM CHLORIDE .6; .31; .03; .02 G/100ML; G/100ML; G/100ML; G/100ML
1000 INJECTION, SOLUTION INTRAVENOUS ONCE
Status: COMPLETED | OUTPATIENT
Start: 2024-04-16 | End: 2024-04-16

## 2024-04-16 RX ADMIN — HYDROMORPHONE HYDROCHLORIDE 1 MG: 1 INJECTION, SOLUTION INTRAMUSCULAR; INTRAVENOUS; SUBCUTANEOUS at 13:27

## 2024-04-16 RX ADMIN — ONDANSETRON 4 MG: 2 INJECTION INTRAMUSCULAR; INTRAVENOUS at 20:08

## 2024-04-16 RX ADMIN — SODIUM CHLORIDE 1000 ML: 9 INJECTION, SOLUTION INTRAVENOUS at 13:19

## 2024-04-16 RX ADMIN — HYDROMORPHONE HYDROCHLORIDE 1 MG: 1 INJECTION, SOLUTION INTRAMUSCULAR; INTRAVENOUS; SUBCUTANEOUS at 01:12

## 2024-04-16 RX ADMIN — HYDROMORPHONE HYDROCHLORIDE 1 MG: 1 INJECTION, SOLUTION INTRAMUSCULAR; INTRAVENOUS; SUBCUTANEOUS at 16:22

## 2024-04-16 RX ADMIN — ACETAMINOPHEN 650 MG: 325 TABLET ORAL at 23:30

## 2024-04-16 RX ADMIN — HYDROMORPHONE HYDROCHLORIDE 1 MG: 1 INJECTION, SOLUTION INTRAMUSCULAR; INTRAVENOUS; SUBCUTANEOUS at 20:08

## 2024-04-16 RX ADMIN — SODIUM CHLORIDE, POTASSIUM CHLORIDE, SODIUM LACTATE AND CALCIUM CHLORIDE 1000 ML: 600; 310; 30; 20 INJECTION, SOLUTION INTRAVENOUS at 01:14

## 2024-04-16 RX ADMIN — HYDROMORPHONE HYDROCHLORIDE 0.5 MG: 1 INJECTION, SOLUTION INTRAMUSCULAR; INTRAVENOUS; SUBCUTANEOUS at 03:19

## 2024-04-16 RX ADMIN — SODIUM CHLORIDE: 9 INJECTION, SOLUTION INTRAVENOUS at 17:30

## 2024-04-16 RX ADMIN — HYDROMORPHONE HYDROCHLORIDE 1 MG: 1 INJECTION, SOLUTION INTRAMUSCULAR; INTRAVENOUS; SUBCUTANEOUS at 03:18

## 2024-04-16 ASSESSMENT — PAIN DESCRIPTION - ORIENTATION
ORIENTATION: LEFT

## 2024-04-16 ASSESSMENT — PAIN SCALES - GENERAL
PAINLEVEL_OUTOF10: 8
PAINLEVEL_OUTOF10: 7
PAINLEVEL_OUTOF10: 7

## 2024-04-16 ASSESSMENT — PAIN DESCRIPTION - DESCRIPTORS
DESCRIPTORS: ACHING

## 2024-04-16 ASSESSMENT — PAIN DESCRIPTION - LOCATION
LOCATION: FLANK

## 2024-04-16 NOTE — ADT AUTH CERT
Progress Notes by Ariane Johnson MD at 4/13/2024  8:48 AM    Author: Ariane Johnson MD Service: Obstetrics & Gynecology Author Type: Physician   Filed: 4/13/2024  9:30 AM Date of Service: 4/13/2024  8:48 AM Status: Signed   : Ariane Johnson MD (Physician)   Antepartum Progress Note     S:  Dc had increased left sided flank pain overnight. Initially attempted oral dilaudid without relief. Received additional dose of 1 mg IV dilaudid with some relief however still requiring pain medication this morning. Pain is all on left side wrapping around to the front     Still feels like she can completely empty her bladder. Noticing some possible small sediment but no stones. Baby very active. Denies fevers/chills.     O:  Patient Vitals for the past 24 hrs:    BP Temp Temp src Pulse Resp SpO2   04/13/24 0749 101/62 98.4 °F (36.9 °C) Oral 83 16 99 %   04/13/24 0316 116/66 97.9 °F (36.6 °C) Oral 88 16 100 %   04/12/24 1929 100/65 98.5 °F (36.9 °C) Oral 79 16 96 %   04/12/24 1508 107/60 98.6 °F (37 °C) Oral 78 16 97 %      Gen - A&O, NAD, resting comfortably  HEENT - normocephalic  Resp - non-labored  CV - heart rate wnl  Abd - soft, nt, nd, gravid, no rebound or guarding, some moderate tenderness to palpation in the left CVA region, no right CVAT   - deferred  Ext - no edema bilaterally     4/12/24 NST- AM 4/13 NST pending:   Indication: suspected kidney stone  FHTs: baseline 150, moderate variability, +accels, no decels  [place]: no ctx  Time: 20 min  Interpretation: reactive cat 1 tracing     Last 3 CMP:   No results for input(s): \"NA\", \"K\", \"CL\", \"CO2\", \"BUN\", \"CREATININE\", \"GLUCOSE\", \"CALCIUM\", \"PROT\", \"LABALBU\", \"BILITOT\", \"ALKPHOS\", \"AST\", \"ALT\" in the last 72 hours.     Last 3 CBC:   No results for input(s): \"WBC\", \"RBC\", \"HGB\", \"HCT\", \"MCV\", \"MCH\", \"MCHC\", \"RDW\", \"PLT\", \"MPV\" in the last 72 hours.     Urin analysis:   No results for input(s): \"COLORU\", \"CLARITYU\", \"SPECGRAV\", \"PHUR\", \"PROTEINU\", \"GLUCOSEU\",

## 2024-04-16 NOTE — H&P
Department of Obstetrics and Gynecology   Obstetrics History and Physical  H&P Admission Inpatient  Note        HISTORY OF PRESENT ILLNESS:      The patient is a 33 y.o. female at 32w1d who represents to the hospital for pain control for severe renal versus ureteral colic pain. This is her fourth presentation to the [facility] in the past week for the same complaint, including spending most of last week admitted for symptom control.  Her pain is located in the left mid back, radiates to the left flank, and occasional urethral pain. She tried taking two 5mg oxycodone tabs at home this morning to manage the pain, but it did not provide adequate relief.  She denies feelings of incomplete bladder emptying at the present time.   Denies fevers or chills.  She is understandably frustrated with the situation and her recurrent pain.  Feels active fetal movements. Denies uterine pain or contractions.    Urine culture was negative x 2 last week.     Renal ultrasound showed mild-moderate left hydronephrosis and a 7mm left renal stone.     4/12/24 Renal US:  FINDINGS:  Mild right and mild to moderate left hydronephrosis is likely physiologic given  gravid uterus. A 7 mm calculus in the left lower pole is nonobstructing. No  other shadowing urinary calculi shown.  RIGHT KIDNEY: measures 12.6 cm in length.  LEFT KIDNEY: measures 12.5 cm in length.  AORTA: Normal caliber in its visualized portions.     COMMON ILIAC ARTERIES: Obscured by gravid uterus.  IVC: Normal caliber in its visualized portions.  BLADDER: Incompletely distended.     IMPRESSION:  Mild to moderate bilateral hydronephrosis is likely physiologic given gravid  uterus. Nonobstructing left lower pole calculus.      PRENATAL CARE:  Patient Active Problem List    Diagnosis Date Noted    Kidney stone complicating pregnancy, third trimester 04/11/2024    31 weeks gestation of pregnancy 04/09/2024    Pregnancy 11/07/2023     Primary Provider: Lenora WILLIS by LMP=9 wk  3.80 - 5.20 M/uL    Hemoglobin 12.0 11.5 - 16.0 g/dL    Hematocrit 34.5 (L) 35.0 - 47.0 %    MCV 90.6 80.0 - 99.0 FL    MCH 31.5 26.0 - 34.0 PG    MCHC 34.8 30.0 - 36.5 g/dL    RDW 12.2 11.5 - 14.5 %    Platelets 247 150 - 400 K/uL    MPV 10.9 8.9 - 12.9 FL    Nucleated RBCs 0.0 0  WBC    nRBC 0.00 0.00 - 0.01 K/uL    Neutrophils % 85 (H) 32 - 75 %    Lymphocytes % 9 (L) 12 - 49 %    Monocytes % 5 5 - 13 %    Eosinophils % 0 0 - 7 %    Basophils % 0 0 - 1 %    Immature Granulocytes % 1 (H) 0.0 - 0.5 %    Neutrophils Absolute 13.5 (H) 1.8 - 8.0 K/UL    Lymphocytes Absolute 1.5 0.8 - 3.5 K/UL    Monocytes Absolute 0.7 0.0 - 1.0 K/UL    Eosinophils Absolute 0.0 0.0 - 0.4 K/UL    Basophils Absolute 0.1 0.0 - 0.1 K/UL    Immature Granulocytes Absolute 0.1 (H) 0.00 - 0.04 K/UL    Differential Type AUTOMATED     Comprehensive Metabolic Panel   Result Value Ref Range    Sodium 134 (L) 136 - 145 mmol/L    Potassium 4.1 3.5 - 5.1 mmol/L    Chloride 105 97 - 108 mmol/L    CO2 23 21 - 32 mmol/L    Anion Gap 6 5 - 15 mmol/L    Glucose 85 65 - 100 mg/dL    BUN 10 6 - 20 MG/DL    Creatinine 0.71 0.55 - 1.02 MG/DL    Bun/Cre Ratio 14 12 - 20      Est, Glom Filt Rate >90 >60 ml/min/1.73m2    Calcium 9.4 8.5 - 10.1 MG/DL    Total Bilirubin 0.7 0.2 - 1.0 MG/DL    ALT 36 12 - 78 U/L    AST 35 15 - 37 U/L    Alk Phosphatase 82 45 - 117 U/L    Total Protein 6.1 (L) 6.4 - 8.2 g/dL    Albumin 2.6 (L) 3.5 - 5.0 g/dL    Globulin 3.5 2.0 - 4.0 g/dL    Albumin/Globulin Ratio 0.7 (L) 1.1 - 2.2         ASSESSMENT AND PLAN:      Principal Problem:    Kidney stone complicating pregnancy, third trimester  Recurrent severe pain due to renal vs ureteral colic from left kidney stone  Failed outpatient conservative management with now 4 presentations to the hospital in the past week.    Plan:   IV fluid bolus with normal saline, then 150cc/hr  IV dilaudid and PO dilaudid for pain management, tylenol for less severe pain  Rechecked CBC and CMP -

## 2024-04-16 NOTE — PROGRESS NOTES
Dc is feeling better after IV dilaudid.    After conversation, she has decided to proceed with cystoscopy with ureteral stent placement tomorrow with Urology team.  We have opted to defer CT scan at this time as it would not change the plan for stent placement and she is not interested in further attempts at conservative management, and I agree.     NPO at midnight.  Discussed fetal monitoring during and after procedure tomorrow.    Non-stress test  Indication: renal colic from left kidney stone  FHR: baseline 140, moderate variability, accelerations present, no decelerations  [place]: contractions not noted  Duration monitored: >30 minutes  Interpretation: reactive and reassuring, Category 1      Ni Cooley MD

## 2024-04-16 NOTE — ED PROVIDER NOTES
Not on file    Years of education: Not on file    Highest education level: Not on file   Occupational History    Not on file   Tobacco Use    Smoking status: Never    Smokeless tobacco: Never   Substance and Sexual Activity    Alcohol use: Not Currently    Drug use: Never    Sexual activity: Yes     Partners: Male     Birth control/protection: Pill     Comment: 17 weeks pregnant   Other Topics Concern    Not on file   Social History Narrative    Not on file     Social Determinants of Health     Financial Resource Strain: Low Risk  (1/4/2024)    Overall Financial Resource Strain (CARDIA)     Difficulty of Paying Living Expenses: Not hard at all   Food Insecurity: No Food Insecurity (4/11/2024)    Hunger Vital Sign     Worried About Running Out of Food in the Last Year: Never true     Ran Out of Food in the Last Year: Never true   Transportation Needs: No Transportation Needs (4/11/2024)    PRAPARE - Transportation     Lack of Transportation (Medical): No     Lack of Transportation (Non-Medical): No   Physical Activity: Not on file   Stress: Not on file   Social Connections: Not on file   Intimate Partner Violence: Not on file   Housing Stability: Low Risk  (4/11/2024)    Housing Stability Vital Sign     Unable to Pay for Housing in the Last Year: No     Number of Places Lived in the Last Year: 1     Unstable Housing in the Last Year: No         ALLERGIES: Penicillins    Review of Systems   All other systems reviewed and are negative.      Vitals:    04/16/24 0043   BP: 106/64   Pulse: 89   Resp: 20   Temp: 97.6 °F (36.4 °C)   TempSrc: Oral            Physical Exam  Vitals and nursing note reviewed. Exam conducted with a chaperone present.   Constitutional:       Appearance: She is normal weight.      Comments: Sitting up in bed at an incline, appears uncomfortable with some wincing but able to talk and answer questions   HENT:      Head: Atraumatic.   Eyes:      Extraocular Movements: Extraocular movements intact.  helps as her pain seems to escalate at night while sleeping    Pt to call and check in with [facility] about the above pain mgmnt plan to see if they'd like to make any adjustments    Ok to discharge home [DG]      ED Course User Index  [DG] Elicia Rowland MD     CLINICAL MPRESSION:  Renal stone in pregnancy, 3rd timester

## 2024-04-16 NOTE — PROGRESS NOTES
1228-pt arrived with complaints of left flank pain, reports having a left kidney stone, has been admitted multiple times due to pain control    1234-left message for Dr Cooley regarding pt's arrival/complaints, awaiting return call    1303-spoke with Dr Cooley, orders received

## 2024-04-16 NOTE — DISCHARGE INSTRUCTIONS
Kidney Stone: Care Instructions  Your Care Instructions     Kidney stones are formed when salts, minerals, and other substances normally found in the urine clump together. They can be as small as grains of sand or, rarely, as large as golf balls.  While the stone is traveling through the ureter, which is the tube that carries urine from the kidney to the bladder, you will probably feel pain. The pain may be mild or very severe. You may also have some blood in your urine. As soon as the stone reaches the bladder, any intense pain should go away.  If a stone is too large to pass on its own, you may need a medical procedure to help you pass the stone.  The doctor has checked you carefully, but problems can develop later. If you notice any problems or new symptoms, get medical treatment right away.  Follow-up care is a key part of your treatment and safety. Be sure to make and go to all appointments, and call your doctor if you are having problems. It's also a good idea to know your test results and keep a list of the medicines you take.  How can you care for yourself at home?  Drink plenty of fluids. If you have kidney, heart, or liver disease and have to limit fluids, talk with your doctor before you increase the amount of fluids you drink.  Take pain medicines exactly as directed. Call your doctor if you think you are having a problem with your medicine.  If the doctor gave you a prescription medicine for pain, take it as prescribed.  If you are not taking a prescription pain medicine, ask your doctor if you can take an over-the-counter medicine. Read and follow all instructions on the label.  Your doctor may ask you to strain your urine so that you can collect your kidney stone when it passes. You can use a kitchen strainer or a tea strainer to catch the stone. Store it in a plastic bag until you see your doctor again.  Preventing future kidney stones  Some changes in your diet may help prevent kidney stones.  Depending on the cause of your stones, your doctor may recommend that you:  Drink plenty of fluids. If you have kidney, heart, or liver disease and have to limit fluids, talk with your doctor before you increase the amount of fluids you drink.  Limit coffee, tea, and alcohol. Also avoid grapefruit juice.  Do not take more than the recommended daily dose of vitamins C and D.  Avoid antacids such as Maalox, Mylanta, or Tums.  Limit the amount of salt (sodium) in your diet.  Eat a balanced diet that is not too high in protein.  Limit foods that are high in a substance called oxalate, which can cause kidney stones. These foods include dark green vegetables, rhubarb, chocolate, wheat bran, nuts, cranberries, and beans.  When should you call for help?   Call your doctor now or seek immediate medical care if:    You cannot keep down fluids.     Your pain gets worse.     You have a fever or chills.     You have new or worse pain in your back just below your rib cage (the flank area).     You have new or more blood in your urine.   Watch closely for changes in your health, and be sure to contact your doctor if:    You do not get better as expected.   Where can you learn more?  Go to https://www.[employer].net/patientEd and enter X633 to learn more about \"Kidney Stone: Care Instructions.\"  Current as of: November 15, 2023               Content Version: 14.0  © 2006-2024 [employer].   Care instructions adapted under license by [employer]. If you have questions about a medical condition or this instruction, always ask your healthcare professional. [employer] disclaims any warranty or liability for your use of this information.

## 2024-04-16 NOTE — PROGRESS NOTES
Bedside and Verbal shift change report given to Rosita QUINTANA RN (oncoming nurse) by Magalis MCCAIN RN (offgoing nurse). Report included the following information Nurse Handoff Report, Index, Intake/Output, MAR, and Recent Results.

## 2024-04-16 NOTE — PROGRESS NOTES
~0030: The patient arrived from home by wheelchair with reports of increasing pain with her kidney stone.  The patient reports that she took Oxycodone at 2305 and Zofran at 2315 with minimal relief.  The patient and her significant other are escorted to [facility].  ~0053: Dr Rowland is at the bedside speaking with the patient.  The plan of care is discussed (IV fluids and pain medication).  The patient and her significant other agree with the plan.  ~0250: Dr Rowland is updated on the patent's reports of pain (4/10 from 8-9/10). She states that the patient may be discharged home.    ~0255: The patient states that the pain is getting worse (5-6/10).  Dr Rowlnad is updated.  Verbal order received for more Dilaudid and plan to D/C home in 1 hour post administration.  ~0403: Dr Rowland is at the bedside speaking with the patient about her discharge home and answering the patient's questions.  ~0430: The patient is given verbal and a copy of the discharge instructions for kidney stones.  The patient verbalizes understanding.  All belongings are collected and the patient is discharged by wheelchair.

## 2024-04-16 NOTE — PROGRESS NOTES
1315: Report received from SENDY Yepez RN.    1349: CASA Urban, urology NP at bedside discussing POC    1915: TRANSFER - OUT REPORT:    Verbal report given to [facility] RN on Dc Eduardo  being transferred to [facility] for routine progression of patient care       Report consisted of patient's Situation, Background, Assessment and   Recommendations(SBAR).     Information from the following report(s) Nurse Handoff Report, Intake/Output, MAR, and Recent Results was reviewed with the receiving nurse.           Lines:   Peripheral IV 04/16/24 Left;Posterior Forearm (Active)        Opportunity for questions and clarification was provided.      Patient transported with:  Registered Nurse

## 2024-04-16 NOTE — CONSULTS
Requesting Provider: Ni Cooley MD              Patient: Dc Eduardo MRN: [id number]  SSN: [id number]    YOB: 1990  Age: 33 y.o.  Sex: female     Location: [facility]/       Code Status: Prior   PCP: Miky Bear MD  - [phone]   Emergency Contact:  Primary Emergency Contact: Harjit Ma, Home Phone: [phone]   Race/Mormon/Language: Unavailable / Unknown / Speaks English   Payor: Payor: [employer] / Plan: [employer] FLEXCARE HMO / Product Type: *No Product type* /    Prior Admission Data: 4/16/24 [facility] 3 [facility]     Hospitalized:  Hospital Day: 1 - Admitted 4/16/2024 12:19 PM     CONSULTANTS  None   ADMISSION DIAGNOSES  [unfilled]      Assessment/Plan:       34 yo F 32 weeks pregnant with left flank pain, mild Left hydronephrosis, 7 mm Left renal stone     - Discussed next steps including consideration of stent placement for persistent intractable pain. The patient is considering moving forward with an intervention; however, she would also like to consider obtaining a CT A/P prior to making any surgical decisions as long as her OB is okay with obtaining a CT during pregnancy. She will discuss further with her spouse and OB team. We will follow up on decisions and review any imaging if ordered. Recommend staying NPO at midnight for possible intervention tomorrow. In the interim continue pain management and supportive care. Following along. Discussed with the OB team and Dr. Almeida.      CC: [unfilled]   HPI: She is a 33 y.o. female with no significant PMH who is 32 weeks pregnant who  is admitted for left flank pain. Urology is consulted for a kidney stone. This is her 4th admission in 1 week for her symptoms. She reports onset of left flank pain 1 week ago. The pain is intermittent, severe when it occurs, with associated nausea and vomiting. The pain was initially controlled with position changes, alleviated when sitting and aggravating when lying flat, however, now it has progressed and is only  Pulse Resp SpO2   04/16/24 1222 108/63 98.3 °F (36.8 °C) Oral (!) 102 18 99 %       [unfilled]   (2) ENMT:   moist mucous membranes, no nasal drainage or congestion    (3) Respiratory:  breathing easily, unlabored, no distress   (4) GI:  no abdominal masses, tenderness, or distension    (5) :   Voiding independently, left CVA tenderness   (6) Lymphatic:  no adenopathy or edema   (7) Muscloskeletal:  no gross deformity, Normal ROM   (8) Skin:  no rash, warm, dry    (9) Neuro:  no focal deficits, Alert      Signed By: CHANDLER Zarco - NP  - April 16, 2024

## 2024-04-16 NOTE — TELEPHONE ENCOUNTER
Patient called, name and  verified. Patient is calling c/o as she went to the ER again last night with pain form her kidney stones. She was sent home in the hopes that she had enough pain medication to help her through. She reports the regimen she was placed on is not helping her and she wanted to know if she can increase the oxycodone or if you think she should present back to the ER. She reports that she has been there 5 nights out of the past week. Please let me know what messages you would like relayed to your pt. She is currently a  and 32w1d. Age is 33.

## 2024-04-17 ENCOUNTER — ANESTHESIA (OUTPATIENT)
Facility: HOSPITAL | Age: 34
End: 2024-04-17
Payer: COMMERCIAL

## 2024-04-17 ENCOUNTER — ANESTHESIA EVENT (OUTPATIENT)
Facility: HOSPITAL | Age: 34
End: 2024-04-17
Payer: COMMERCIAL

## 2024-04-17 PROCEDURE — C1758 CATHETER, URETERAL: HCPCS | Performed by: UROLOGY

## 2024-04-17 PROCEDURE — 59025 FETAL NON-STRESS TEST: CPT | Performed by: OBSTETRICS & GYNECOLOGY

## 2024-04-17 PROCEDURE — 2709999900 HC NON-CHARGEABLE SUPPLY: Performed by: UROLOGY

## 2024-04-17 PROCEDURE — 6370000000 HC RX 637 (ALT 250 FOR IP): Performed by: OBSTETRICS & GYNECOLOGY

## 2024-04-17 PROCEDURE — 3600000003 HC SURGERY LEVEL 3 BASE: Performed by: UROLOGY

## 2024-04-17 PROCEDURE — 2500000003 HC RX 250 WO HCPCS: Performed by: NURSE ANESTHETIST, CERTIFIED REGISTERED

## 2024-04-17 PROCEDURE — 6360000002 HC RX W HCPCS: Performed by: OBSTETRICS & GYNECOLOGY

## 2024-04-17 PROCEDURE — 3700000001 HC ADD 15 MINUTES (ANESTHESIA): Performed by: UROLOGY

## 2024-04-17 PROCEDURE — 6360000002 HC RX W HCPCS: Performed by: NURSE ANESTHETIST, CERTIFIED REGISTERED

## 2024-04-17 PROCEDURE — 87086 URINE CULTURE/COLONY COUNT: CPT

## 2024-04-17 PROCEDURE — 99232 SBSQ HOSP IP/OBS MODERATE 35: CPT | Performed by: OBSTETRICS & GYNECOLOGY

## 2024-04-17 PROCEDURE — 3600000013 HC SURGERY LEVEL 3 ADDTL 15MIN: Performed by: UROLOGY

## 2024-04-17 PROCEDURE — C1769 GUIDE WIRE: HCPCS | Performed by: UROLOGY

## 2024-04-17 PROCEDURE — 7100000001 HC PACU RECOVERY - ADDTL 15 MIN: Performed by: UROLOGY

## 2024-04-17 PROCEDURE — 0T778DZ DILATION OF LEFT URETER WITH INTRALUMINAL DEVICE, VIA NATURAL OR ARTIFICIAL OPENING ENDOSCOPIC: ICD-10-PCS | Performed by: UROLOGY

## 2024-04-17 PROCEDURE — C2617 STENT, NON-COR, TEM W/O DEL: HCPCS | Performed by: UROLOGY

## 2024-04-17 PROCEDURE — 2580000003 HC RX 258: Performed by: OBSTETRICS & GYNECOLOGY

## 2024-04-17 PROCEDURE — 3700000000 HC ANESTHESIA ATTENDED CARE: Performed by: UROLOGY

## 2024-04-17 PROCEDURE — 7100000000 HC PACU RECOVERY - FIRST 15 MIN: Performed by: UROLOGY

## 2024-04-17 PROCEDURE — 6360000002 HC RX W HCPCS: Performed by: ANESTHESIOLOGY

## 2024-04-17 PROCEDURE — 2580000003 HC RX 258: Performed by: NURSE ANESTHETIST, CERTIFIED REGISTERED

## 2024-04-17 PROCEDURE — 1120000000 HC RM PRIVATE OB

## 2024-04-17 DEVICE — URETERAL STENT
Type: IMPLANTABLE DEVICE | Site: URETHRA | Status: FUNCTIONAL
Brand: CONTOUR™

## 2024-04-17 RX ORDER — CEFAZOLIN SODIUM 1 G/3ML
INJECTION, POWDER, FOR SOLUTION INTRAMUSCULAR; INTRAVENOUS PRN
Status: DISCONTINUED | OUTPATIENT
Start: 2024-04-17 | End: 2024-04-17 | Stop reason: SDUPTHER

## 2024-04-17 RX ORDER — SODIUM CHLORIDE 9 MG/ML
INJECTION, SOLUTION INTRAVENOUS PRN
Status: DISCONTINUED | OUTPATIENT
Start: 2024-04-17 | End: 2024-04-17 | Stop reason: HOSPADM

## 2024-04-17 RX ORDER — DEXAMETHASONE SODIUM PHOSPHATE 4 MG/ML
INJECTION, SOLUTION INTRA-ARTICULAR; INTRALESIONAL; INTRAMUSCULAR; INTRAVENOUS; SOFT TISSUE PRN
Status: DISCONTINUED | OUTPATIENT
Start: 2024-04-17 | End: 2024-04-17 | Stop reason: SDUPTHER

## 2024-04-17 RX ORDER — MIDAZOLAM HYDROCHLORIDE 2 MG/2ML
2 INJECTION, SOLUTION INTRAMUSCULAR; INTRAVENOUS
Status: DISCONTINUED | OUTPATIENT
Start: 2024-04-17 | End: 2024-04-17 | Stop reason: HOSPADM

## 2024-04-17 RX ORDER — SODIUM CHLORIDE 0.9 % (FLUSH) 0.9 %
5-40 SYRINGE (ML) INJECTION EVERY 12 HOURS SCHEDULED
Status: DISCONTINUED | OUTPATIENT
Start: 2024-04-17 | End: 2024-04-17 | Stop reason: HOSPADM

## 2024-04-17 RX ORDER — OXYCODONE HYDROCHLORIDE 5 MG/1
5 TABLET ORAL
Status: DISCONTINUED | OUTPATIENT
Start: 2024-04-17 | End: 2024-04-17 | Stop reason: HOSPADM

## 2024-04-17 RX ORDER — ROCURONIUM BROMIDE 10 MG/ML
INJECTION, SOLUTION INTRAVENOUS PRN
Status: DISCONTINUED | OUTPATIENT
Start: 2024-04-17 | End: 2024-04-17 | Stop reason: SDUPTHER

## 2024-04-17 RX ORDER — SODIUM CHLORIDE, SODIUM LACTATE, POTASSIUM CHLORIDE, CALCIUM CHLORIDE 600; 310; 30; 20 MG/100ML; MG/100ML; MG/100ML; MG/100ML
INJECTION, SOLUTION INTRAVENOUS CONTINUOUS
Status: DISCONTINUED | OUTPATIENT
Start: 2024-04-17 | End: 2024-04-17 | Stop reason: HOSPADM

## 2024-04-17 RX ORDER — PROPOFOL 10 MG/ML
INJECTION, EMULSION INTRAVENOUS PRN
Status: DISCONTINUED | OUTPATIENT
Start: 2024-04-17 | End: 2024-04-17 | Stop reason: SDUPTHER

## 2024-04-17 RX ORDER — FENTANYL CITRATE 50 UG/ML
100 INJECTION, SOLUTION INTRAMUSCULAR; INTRAVENOUS
Status: COMPLETED | OUTPATIENT
Start: 2024-04-17 | End: 2024-04-17

## 2024-04-17 RX ORDER — PROCHLORPERAZINE EDISYLATE 5 MG/ML
5 INJECTION INTRAMUSCULAR; INTRAVENOUS
Status: DISCONTINUED | OUTPATIENT
Start: 2024-04-17 | End: 2024-04-17 | Stop reason: HOSPADM

## 2024-04-17 RX ORDER — SODIUM CHLORIDE 0.9 % (FLUSH) 0.9 %
5-40 SYRINGE (ML) INJECTION PRN
Status: DISCONTINUED | OUTPATIENT
Start: 2024-04-17 | End: 2024-04-17 | Stop reason: HOSPADM

## 2024-04-17 RX ORDER — HYDROMORPHONE HYDROCHLORIDE 1 MG/ML
0.5 INJECTION, SOLUTION INTRAMUSCULAR; INTRAVENOUS; SUBCUTANEOUS EVERY 5 MIN PRN
Status: DISCONTINUED | OUTPATIENT
Start: 2024-04-17 | End: 2024-04-17 | Stop reason: HOSPADM

## 2024-04-17 RX ORDER — ONDANSETRON 2 MG/ML
4 INJECTION INTRAMUSCULAR; INTRAVENOUS
Status: DISCONTINUED | OUTPATIENT
Start: 2024-04-17 | End: 2024-04-17 | Stop reason: HOSPADM

## 2024-04-17 RX ORDER — LIDOCAINE HYDROCHLORIDE 20 MG/ML
INJECTION, SOLUTION EPIDURAL; INFILTRATION; INTRACAUDAL; PERINEURAL PRN
Status: DISCONTINUED | OUTPATIENT
Start: 2024-04-17 | End: 2024-04-17 | Stop reason: SDUPTHER

## 2024-04-17 RX ORDER — NALOXONE HYDROCHLORIDE 0.4 MG/ML
INJECTION, SOLUTION INTRAMUSCULAR; INTRAVENOUS; SUBCUTANEOUS PRN
Status: DISCONTINUED | OUTPATIENT
Start: 2024-04-17 | End: 2024-04-17 | Stop reason: HOSPADM

## 2024-04-17 RX ORDER — SUCCINYLCHOLINE/SOD CL,ISO/PF 200MG/10ML
SYRINGE (ML) INTRAVENOUS PRN
Status: DISCONTINUED | OUTPATIENT
Start: 2024-04-17 | End: 2024-04-17 | Stop reason: SDUPTHER

## 2024-04-17 RX ORDER — SODIUM CHLORIDE, SODIUM LACTATE, POTASSIUM CHLORIDE, CALCIUM CHLORIDE 600; 310; 30; 20 MG/100ML; MG/100ML; MG/100ML; MG/100ML
INJECTION, SOLUTION INTRAVENOUS CONTINUOUS PRN
Status: DISCONTINUED | OUTPATIENT
Start: 2024-04-17 | End: 2024-04-17 | Stop reason: SDUPTHER

## 2024-04-17 RX ORDER — DOCUSATE SODIUM 100 MG/1
100 CAPSULE, LIQUID FILLED ORAL 2 TIMES DAILY
Status: DISCONTINUED | OUTPATIENT
Start: 2024-04-17 | End: 2024-04-18 | Stop reason: HOSPADM

## 2024-04-17 RX ORDER — FENTANYL CITRATE 50 UG/ML
INJECTION, SOLUTION INTRAMUSCULAR; INTRAVENOUS PRN
Status: DISCONTINUED | OUTPATIENT
Start: 2024-04-17 | End: 2024-04-17 | Stop reason: SDUPTHER

## 2024-04-17 RX ORDER — LIDOCAINE HYDROCHLORIDE 10 MG/ML
1 INJECTION, SOLUTION EPIDURAL; INFILTRATION; INTRACAUDAL; PERINEURAL
Status: DISCONTINUED | OUTPATIENT
Start: 2024-04-17 | End: 2024-04-17 | Stop reason: HOSPADM

## 2024-04-17 RX ORDER — HYDRALAZINE HYDROCHLORIDE 20 MG/ML
10 INJECTION INTRAMUSCULAR; INTRAVENOUS
Status: DISCONTINUED | OUTPATIENT
Start: 2024-04-17 | End: 2024-04-17 | Stop reason: HOSPADM

## 2024-04-17 RX ORDER — ONDANSETRON 2 MG/ML
INJECTION INTRAMUSCULAR; INTRAVENOUS PRN
Status: DISCONTINUED | OUTPATIENT
Start: 2024-04-17 | End: 2024-04-17 | Stop reason: SDUPTHER

## 2024-04-17 RX ORDER — POLYETHYLENE GLYCOL 3350 17 G/17G
17 POWDER, FOR SOLUTION ORAL DAILY PRN
Status: DISCONTINUED | OUTPATIENT
Start: 2024-04-17 | End: 2024-04-18 | Stop reason: HOSPADM

## 2024-04-17 RX ORDER — ACETAMINOPHEN 500 MG
1000 TABLET ORAL ONCE
Status: DISCONTINUED | OUTPATIENT
Start: 2024-04-17 | End: 2024-04-17 | Stop reason: HOSPADM

## 2024-04-17 RX ORDER — FENTANYL CITRATE 50 UG/ML
25 INJECTION, SOLUTION INTRAMUSCULAR; INTRAVENOUS EVERY 5 MIN PRN
Status: DISCONTINUED | OUTPATIENT
Start: 2024-04-17 | End: 2024-04-17 | Stop reason: HOSPADM

## 2024-04-17 RX ADMIN — SODIUM CHLORIDE: 9 INJECTION, SOLUTION INTRAVENOUS at 13:08

## 2024-04-17 RX ADMIN — FENTANYL CITRATE 25 MCG: 50 INJECTION, SOLUTION INTRAMUSCULAR; INTRAVENOUS at 11:35

## 2024-04-17 RX ADMIN — HYDROMORPHONE HYDROCHLORIDE 1 MG: 1 INJECTION, SOLUTION INTRAMUSCULAR; INTRAVENOUS; SUBCUTANEOUS at 07:32

## 2024-04-17 RX ADMIN — LIDOCAINE HYDROCHLORIDE 60 MG: 20 INJECTION, SOLUTION EPIDURAL; INFILTRATION; INTRACAUDAL; PERINEURAL at 11:26

## 2024-04-17 RX ADMIN — PROPOFOL 200 MG: 10 INJECTION, EMULSION INTRAVENOUS at 11:26

## 2024-04-17 RX ADMIN — ROCURONIUM BROMIDE 5 MG: 50 INJECTION INTRAVENOUS at 11:26

## 2024-04-17 RX ADMIN — FENTANYL CITRATE 25 MCG: 50 INJECTION INTRAMUSCULAR; INTRAVENOUS at 10:58

## 2024-04-17 RX ADMIN — ONDANSETRON 4 MG: 2 INJECTION INTRAMUSCULAR; INTRAVENOUS at 11:50

## 2024-04-17 RX ADMIN — DOCUSATE SODIUM 100 MG: 100 CAPSULE, LIQUID FILLED ORAL at 19:16

## 2024-04-17 RX ADMIN — Medication 160 MG: at 11:26

## 2024-04-17 RX ADMIN — SODIUM CHLORIDE, POTASSIUM CHLORIDE, SODIUM LACTATE AND CALCIUM CHLORIDE: 600; 310; 30; 20 INJECTION, SOLUTION INTRAVENOUS at 11:18

## 2024-04-17 RX ADMIN — POLYETHYLENE GLYCOL 3350 17 G: 17 POWDER, FOR SOLUTION ORAL at 19:16

## 2024-04-17 RX ADMIN — FENTANYL CITRATE 25 MCG: 50 INJECTION, SOLUTION INTRAMUSCULAR; INTRAVENOUS at 11:26

## 2024-04-17 RX ADMIN — HYDROMORPHONE HYDROCHLORIDE 1 MG: 1 INJECTION, SOLUTION INTRAMUSCULAR; INTRAVENOUS; SUBCUTANEOUS at 03:26

## 2024-04-17 RX ADMIN — DEXAMETHASONE SODIUM PHOSPHATE 4 MG: 4 INJECTION, SOLUTION INTRAMUSCULAR; INTRAVENOUS at 11:50

## 2024-04-17 RX ADMIN — CEFAZOLIN 2 G: 1 INJECTION, POWDER, FOR SOLUTION INTRAMUSCULAR; INTRAVENOUS at 11:33

## 2024-04-17 ASSESSMENT — PAIN - FUNCTIONAL ASSESSMENT: PAIN_FUNCTIONAL_ASSESSMENT: ACTIVITIES ARE NOT PREVENTED

## 2024-04-17 ASSESSMENT — PAIN SCALES - GENERAL
PAINLEVEL_OUTOF10: 2
PAINLEVEL_OUTOF10: 7

## 2024-04-17 ASSESSMENT — PAIN DESCRIPTION - ORIENTATION: ORIENTATION: ANTERIOR

## 2024-04-17 ASSESSMENT — PAIN DESCRIPTION - DESCRIPTORS: DESCRIPTORS: ACHING

## 2024-04-17 ASSESSMENT — PAIN DESCRIPTION - LOCATION: LOCATION: OTHER (COMMENT)

## 2024-04-17 NOTE — ANESTHESIA POSTPROCEDURE EVALUATION
Department of Anesthesiology  Postprocedure Note    Patient: Dc Eduardo  MRN: [id number]  YOB: 1990  Date of evaluation: 4/17/2024    Procedure Summary       Date: 04/17/24 Room / Location: [facility] MAIN OR [address] [place] [address] MAIN OR    Anesthesia Start: 1118 Anesthesia Stop: 1206    Procedure: CYSTOSCOPY URETERAL STENT INSERTION/  (Urethra) Diagnosis:       Kidney stone      (Kidney stone [N20.0])    Providers: James Rubio MD Responsible Provider: Bharti Ricketts DO    Anesthesia Type: General ASA Status: 2            Anesthesia Type: General    Brenda Phase I: Brenda Score: 10    Brenda Phase II:      Anesthesia Post Evaluation    Patient location during evaluation: PACU  Level of consciousness: awake  Airway patency: patent  Nausea & Vomiting: no nausea  Cardiovascular status: hemodynamically stable  Respiratory status: acceptable  Hydration status: stable  Multimodal analgesia pain management approach  Pain management: adequate    No notable events documented.

## 2024-04-17 NOTE — BRIEF OP NOTE
Brief Postoperative Note      Patient: Dc Eduardo  YOB: 1990  MRN: [id number]    Date of Procedure: 4/17/2024    Pre-Op Diagnosis Codes:     * Kidney stone [N20.0]    Post-Op Diagnosis: Same       Procedure(s):  CYSTOSCOPY URETERAL STENT INSERTION/     Surgeon(s):  James Rubio MD    Assistant:  * No surgical staff found *    Anesthesia: General    Estimated Blood Loss (mL): Minimal    Complications: None    Specimens:   ID Type Source Tests Collected by Time Destination   A : URINE CULTURE AFTER STENT Urine Urine, Cystoscopic CULTURE, URINE James Rubio MD 4/17/2024 1140        Implants:  Implant Name Type Inv. Item Serial No.  Lot No. LRB No. Used Action   STENT URET 6FR L24CM PERCFLX HYDR+ SFT PGTL TAPR TIP W/O - SNA  STENT URET 6FR L24CM PERCFLX HYDR+ SFT PGTL TAPR TIP W/O NA [employer] UROLOGY- [id number] N/A 1 Implanted         Drains: * No LDAs found *    Findings:  Infection Present At Time Of Surgery (PATOS) (choose all levels that have infection present):  No infection present  Other Findings: hydronephrotic drip from stent placed without xray blindly    Electronically signed by James Rubio MD on 4/17/2024 at 12:51 PM

## 2024-04-17 NOTE — OP NOTE
[address]  [address]                            OPERATIVE REPORT      PATIENT NAME: CIELO YANES                 : 1990  MED REC NO: [id number]                       ROOM: 325  ACCOUNT NO: [id number]                       ADMIT DATE: 2024  PROVIDER: James Rubio MD    DATE OF SERVICE:  2024    PREOPERATIVE DIAGNOSES:       1. Left flank pain.     2. Intrauterine pregnancy.     3. Left hydronephrosis.     4. Left renal stone.    POSTOPERATIVE DIAGNOSES:       1. Left flank pain.     2. Intrauterine pregnancy.     3. Left hydronephrosis.     4. Left renal stone.    PROCEDURES PERFORMED:  Cystoscopy, placement of left double-J stent.    SURGEON:  James Rubio MD    ASSISTANT:  None.    ANESTHESIA:  General.    ESTIMATED BLOOD LOSS:  Minimal.    SPECIMENS REMOVED:  Urine from the cystoscope for culture.    INTRAOPERATIVE FINDINGS:  Discussed below.     COMPLICATIONS:  None.    IMPLANTS:  None.    INDICATIONS:  Flank pain.    DESCRIPTION OF PROCEDURE:  After anesthesia, the patient was prepped and draped in the lithotomy position.  The nurses from Labor and Delivery were monitoring the fetal heart tones.  The 21 cystoscope was passed into the urethra, and using an open-ended catheter, a Glidewire was gently inserted into the left ureteral orifice.  The open-ended catheter was passed alongside the wire and these were positioned based on the feel and the length of the tubes into what was felt to likely represent the renal pelvis.  At this point, the open-ended catheter was removed and a 24 cm 6-Spanish double-J stent was passed along the wire.  Before the stent reached the kidney, the wire was withdrawn partially to hopefully allow for curl of the stent within the renal pelvis.  This stent was placed then up to the level of the string, which had been tied and cut short.  The wire was then removed and then this stent

## 2024-04-17 NOTE — CARE COORDINATION
Care Management Initial Assessment       RUR: 7%--low  Readmission? Yes - [facility] 4/9-1/10, 4/11-4/14  1st IM letter given? No  1st  letter given: No    Emergency contact:      Patient readmitted on 4/16 for kidney stone complicating pregnancy in the third trimester. Patient has had two recent admission for the same concern; 4/9-4/10 and 4/. As pain management of stone has been unsuccessful, CYSTOSCOPY URETERAL STENT INSERTION was completed. No CM needs noted at this time.       04/17/24 1512   Service Assessment   Patient Orientation Alert and Oriented   Cognition Alert   History Provided By Medical Record   Primary Caregiver Self   Support Systems Spouse/Significant Other   Prior Functional Level Independent in ADLs/IADLs   Current Functional Level Independent in ADLs/IADLs   Can patient return to prior living arrangement Yes   Ability to make needs known: Good   Family able to assist with home care needs: Yes   Social/Functional History   Type of Home House   Home Layout Two level   ADL Assistance Independent   Homemaking Assistance Independent   Ambulation Assistance Independent   Transfer Assistance Independent   Active  Yes   Occupation Full time employment   Discharge Planning   Type of Residence House   Current Services Prior To Admission None   Potential Assistance Needed N/A   DME Ordered? No   Potential Assistance Purchasing Medications No   Type of Home Care Services None   Patient expects to be discharged to: House   Services At/After Discharge   Transition of Care Consult (CM Consult) N/A   Services At/After Discharge None   Mode of Transport at Discharge Other (see comment)  (in car with significant other)   Confirm Follow Up Transport Self   Condition of Participation: Discharge Planning   The Plan for Transition of Care is related to the following treatment goals: home     Lillie Ruiz LMSW  Care Management [facility]  [phone]

## 2024-04-17 NOTE — ANESTHESIA PRE PROCEDURE
Department of Anesthesiology  Preprocedure Note       Name:  Dc Eduardo   Age:  33 y.o.  :  1990                                          MRN:  [id number]         Date:  2024      Surgeon: Surgeon(s):  James Rubio MD    Procedure: Procedure(s):  CYSTOSCOPY URETERAL STENT INSERTION/     Medications prior to admission:   Prior to Admission medications    Medication Sig Start Date End Date Taking? Authorizing Provider   oxyCODONE (ROXICODONE) 5 MG immediate release tablet Take 1 tablet by mouth every 4 hours as needed for Pain for up to 3 days. Max Daily Amount: 30 mg 24  Ariane Johnson MD   ondansetron (ZOFRAN-ODT) 4 MG disintegrating tablet Take 1 tablet by mouth every 8 hours as needed for Nausea or Vomiting 24   Ariane Johnson MD   Prenatal Vit-Fe Fumarate-FA (PRENATAL PO) Take by mouth    Provider, MD Mari       Current medications:    Current Facility-Administered Medications   Medication Dose Route Frequency Provider Last Rate Last Admin   • polyethylene glycol (GLYCOLAX) packet 17 g  17 g Oral Daily PRN iN Cooley MD       • magnesium hydroxide (MILK OF MAGNESIA) 400 MG/5ML suspension 30 mL  30 mL Oral Daily PRN Ni Cooley MD       • docusate sodium (COLACE) capsule 100 mg  100 mg Oral BID Ni Cooley MD       • acetaminophen (TYLENOL) tablet 650 mg  650 mg Oral Q4H PRN Ni Cooley MD   650 mg at 24 2330   • ondansetron (ZOFRAN) injection 4 mg  4 mg IntraVENous Q6H PRN Ni Cooley MD   4 mg at 24   • 0.9 % sodium chloride infusion   IntraVENous Continuous Ni Cooley  mL/hr at 24 1730 New Bag at 24 1730   • famotidine (PEPCID) 20 mg in sodium chloride (PF) 0.9 % 10 mL injection  20 mg IntraVENous BID PRN Ni Cooley MD       • HYDROmorphone (DILAUDID) tablet 2 mg  2 mg Oral Q4H PRN Ni Cooley MD       • HYDROmorphone HCl PF (DILAUDID) injection 1 mg  1 mg IntraVENous Q3H PRN Lenora

## 2024-04-17 NOTE — PROGRESS NOTES
1203: Alan RN L&D at bedside to monitor FHTs.     TRANSFER - OUT REPORT:    Verbal report given to ***(name) on Dc Eduardo  being transferred to [facility](unit) for routine post-op       Report consisted of patient’s Situation, Background, Assessment and   Recommendations(SBAR).     Time Pre op antibiotic given:1133  Anesthesia Stop time: 1203    Information from the following report(s) SBAR, Kardex, OR Summary, Intake/Output, and MAR was reviewed with the receiving nurse.    Opportunity for questions and clarification was provided.     Is the patient on 02? No           Is the patient on a monitor? No    Is the nurse transporting with the patient? No    Surgical Waiting Area notified of patient's transfer from PACU? Yes

## 2024-04-17 NOTE — PROGRESS NOTES
Bedside and Verbal shift change report given to CASA Espinosa RN (oncoming nurse) by Rosita Arellano RN (offgoing nurse). Report included the following information Nurse Handoff Report, Intake/Output, MAR, and Recent Results.

## 2024-04-17 NOTE — CARE COORDINATION
Transition of Care Plan:    RUR: 7%--low  Prior Level of Functioning: independent  Disposition: home  Follow up appointments: OB/GYN  DME needed: N/A  Transportation at discharge: in car with significant other  Caregiver Contact:   Harjit Ma, significant other, [phone]   Discharge Caregiver contacted prior to discharge? N/A  Care Conference needed? no  Barriers to discharge:  clinical status     04/17/24 1546   Readmission Assessment   Number of Days since last admission? 1-7 days  ([facility] 4/9-4/10/24 & 4/11-4/14/24)   Previous Disposition Home with Family   Who is being Interviewed Patient  (medical record)   What was the patient's/caregiver's perception as to why they think they needed to return back to the hospital? Other (Comment)  (ongoing pain related to kidney stone)   Did you visit your Primary Care Physician after you left the hospital, before you returned this time? No   Why weren't you able to visit your PCP? Did not have an appointment   Did you see a specialist, such as Cardiac, Pulmonary, Orthopedic Physician, etc. after you left the hospital? No   Who advised the patient to return to the hospital? Self-referral   Does the patient report anything that got in the way of taking their medications? No   In our efforts to provide the best possible care to you and others like you, can you think of anything that we could have done to help you after you left the hospital the first time, so that you might not have needed to return so soon? Other (Comment)  (ongoing issue related to kidney stone)     Lillie Ruiz LMSW  Care Management [facility]  [phone]

## 2024-04-17 NOTE — PROGRESS NOTES
Ante Partum Progress Note    Dc Alsawaf  32w2d    Assessment and Plan: 32w2d   Symptomatic left kidney stone: recurrent and necessitating 4 hospital/[facility] presentations in the past week. Not improving with conservative management.   - plan for cystoscopy with stent placement by Urology today; plan for fetal monitoring during and after the procedure  - Urology following, appreciate their help  - IV fluids  - strain urine  - daily NSTs      Orders/Charges: Medium and Non Stress Test    S:  Dc continues to have pain overnight, and did take IV dilaudid. She has not noticed any stone or sediment in her urine.  She denies obstetric complaints.  NPO this morning for Urology procedure.     Vitals:  /71   Pulse 88   Temp 98.5 °F (36.9 °C) (Oral)   Resp 18   LMP 2023 (Exact Date)   SpO2 99%   Temp (24hrs), Av.5 °F (36.9 °C), Min:98.2 °F (36.8 °C), Max:99 °F (37.2 °C)      Last 24hr Input/Output:  No intake or output data in the 24 hours ending 24 0820     Non stress test:   Non-stress test  Indication: left kidney stone  FHR: baseline __ , moderate variability, accelerations present, no decelerations  [place]: contractions not seen  Duration monitored: >20 minutes  Interpretation: reactive and reassuring, Category 1      No data found. No data found.         Exam:  Patient without distress.     Abdomen, fundus soft non-tender, nondistended     Extremities, no redness or tenderness               Labs:     Lab Results   Component Value Date/Time    WBC 15.9 2024 01:30 PM    WBC 11.7 2024 06:22 AM    WBC 12.4 2024 03:04 AM    WBC 11.5 2024 12:00 AM    WBC 11.4 2024 12:00 AM    WBC 8.8 2023 10:51 AM    WBC 8.8 2023 12:00 AM    HGB 12.0 2024 01:30 PM    HGB 10.5 2024 06:22 AM    HGB 11.2 2024 03:04 AM    HGB 11.8 2024 12:00 AM    HGB 11.1 2024 12:00 AM    HGB 11.2 2023 10:51 AM    HGB 12.7 2023 12:00 AM    HCT 34.5  04/16/2024 01:30 PM    HCT 30.5 04/14/2024 06:22 AM    HCT 33.7 04/09/2024 03:04 AM    HCT 35.5 03/22/2024 12:00 AM    HCT 34.0 01/04/2024 12:00 AM    HCT 32.7 12/01/2023 10:51 AM    HCT 38.7 01/04/2023 12:00 AM     04/16/2024 01:30 PM     04/14/2024 06:22 AM     04/09/2024 03:04 AM     03/22/2024 12:00 AM     01/04/2024 12:00 AM     12/01/2023 10:51 AM     01/04/2023 12:00 AM       Recent Results (from the past 24 hour(s))   CBC with Auto Differential    Collection Time: 04/16/24  1:30 PM   Result Value Ref Range    WBC 15.9 (H) 3.6 - 11.0 K/uL    RBC 3.81 3.80 - 5.20 M/uL    Hemoglobin 12.0 11.5 - 16.0 g/dL    Hematocrit 34.5 (L) 35.0 - 47.0 %    MCV 90.6 80.0 - 99.0 FL    MCH 31.5 26.0 - 34.0 PG    MCHC 34.8 30.0 - 36.5 g/dL    RDW 12.2 11.5 - 14.5 %    Platelets 247 150 - 400 K/uL    MPV 10.9 8.9 - 12.9 FL    Nucleated RBCs 0.0 0  WBC    nRBC 0.00 0.00 - 0.01 K/uL    Neutrophils % 85 (H) 32 - 75 %    Lymphocytes % 9 (L) 12 - 49 %    Monocytes % 5 5 - 13 %    Eosinophils % 0 0 - 7 %    Basophils % 0 0 - 1 %    Immature Granulocytes % 1 (H) 0.0 - 0.5 %    Neutrophils Absolute 13.5 (H) 1.8 - 8.0 K/UL    Lymphocytes Absolute 1.5 0.8 - 3.5 K/UL    Monocytes Absolute 0.7 0.0 - 1.0 K/UL    Eosinophils Absolute 0.0 0.0 - 0.4 K/UL    Basophils Absolute 0.1 0.0 - 0.1 K/UL    Immature Granulocytes Absolute 0.1 (H) 0.00 - 0.04 K/UL    Differential Type AUTOMATED     Comprehensive Metabolic Panel    Collection Time: 04/16/24  1:30 PM   Result Value Ref Range    Sodium 134 (L) 136 - 145 mmol/L    Potassium 4.1 3.5 - 5.1 mmol/L    Chloride 105 97 - 108 mmol/L    CO2 23 21 - 32 mmol/L    Anion Gap 6 5 - 15 mmol/L    Glucose 85 65 - 100 mg/dL    BUN 10 6 - 20 MG/DL    Creatinine 0.71 0.55 - 1.02 MG/DL    Bun/Cre Ratio 14 12 - 20      Est, Glom Filt Rate >90 >60 ml/min/1.73m2    Calcium 9.4 8.5 - 10.1 MG/DL    Total Bilirubin 0.7 0.2 - 1.0 MG/DL    ALT 36 12 - 78 U/L    AST

## 2024-04-17 NOTE — PROGRESS NOTES
Patient: Dc Eduardo MRN: [id number]  SSN: [id number]    YOB: 1990  Age: 33 y.o.  Sex: female        ADMITTED: 2024 to Ni Cooley MD by Ni Cooley MD for Kidney stone complicating pregnancy, third trimester [O26.833, N20.0]    Dc Eduardo is doing fair. Continues to have left flank pain, currently tolerable with prn pain medications. She opted not to have the CT last night. Noted rise in serum cr from 0.48 to 0.71.     Vitals: Temp (24hrs), Av.5 °F (36.9 °C), Min:98.2 °F (36.8 °C), Max:99 °F (37.2 °C)    Blood pressure 104/71, pulse 88, temperature 98.5 °F (36.9 °C), temperature source Oral, resp. rate 18, last menstrual period 2023, SpO2 99 %.    Intake and Output:  No intake/output data recorded.  No intake/output data recorded.  YURI Output lats 24 hrs: No data found.   YURI Output last 8 hrs: No data found.  BM over last 24 hrs: No data found.    Physical Exam  General: NAD, pleasant  Respiratory: no distress, breathing easily, room air  Abdomen: soft, no distention; pregnant  : left CVA tenderness, voiding independently   Neuro: Appropriate, no focal neurological deficits  Skin: warm, dry  Extremities: moves all, full ROM    Labs:  CBC:   Lab Results   Component Value Date/Time    WBC 15.9 2024 01:30 PM    HCT 34.5 2024 01:30 PM     2024 01:30 PM     BMP:   Lab Results   Component Value Date/Time     2024 01:30 PM    K 4.1 2024 01:30 PM     2024 01:30 PM    CO2 23 2024 01:30 PM    BUN 10 2024 01:30 PM      Assessment/Plan:   32 yo F 32 weeks pregnant with left flank pain, mild Left hydronephrosis, 7 mm Left renal stone     - Again reviewed consideration of stent placement for intractable left flank pain after failing conservative management. She has decided that she would like to move forward. Risks and benefits reviewed and she wishes to proceed. Case posted today at 11:30 with Dr. Rubio       Supervising Dr. Elle CAVAZOS   Signed By: Ramiro Urban, CHANDLER - NP - April 17, 2024

## 2024-04-17 NOTE — PROGRESS NOTES
TRANSFER - IN REPORT:    Verbal report received from Yumiko Bailey on [facility]  being received from PACU for routine post-op      Report consisted of patient's Situation, Background, Assessment and   Recommendations(SBAR).     Information from the following report(s) Nurse Handoff Report, Surgery Report, Intake/Output, MAR, and Recent Results was reviewed with the receiving nurse.    Opportunity for questions and clarification was provided.      Assessment completed upon patient's arrival to unit and care assumed.    4392 Dr Cooley given updates regarding pt's postproc, NST done by Alan L&KALYAN RN at PACU.

## 2024-04-17 NOTE — PROGRESS NOTES
NSTs reviewed both intra-op and post-op in the PACU and both are reassuring.   NST in PACU is reactive.   Patient is now back in her hospital room.    Ni Cooley MD

## 2024-04-17 NOTE — PROGRESS NOTES
1228: TRANSFER - OUT REPORT:    Verbal report [facility] to Maryam RN(name[facility](unit) for routine post-op       Report consisted of patient’s Situation, Background, Assessment and   Recommendations(SBAR).     Time Pre op antibiotic given:1133  Anesthesia Stop time: 1203    Information from the following report(s) SBAR, Kardex, OR Summary, Intake/Output, and MAR was reviewed with the receiving nurse.    Opportunity for questions and clarification was provided.     Is the patient on 02? No        Is the patient on a monitor? No    Is the nurse transporting with the patient? Yes    Surgical Waiting Area notified of patient's transfer from PACU? Yes

## 2024-04-17 NOTE — FLOWSHEET NOTE
04/17/24 1139   Family Communication    Relationship to Patient Spouse    Phone Number KARL CARLSON [phone]   Family/Significant Other Update Called   Delivery Origin Nurse   Message Disposition Family present - message delivered   Update Given Yes   Family Communication   Family Update Message Procedure started;Surgeon working;Patient stable

## 2024-04-18 ENCOUNTER — TELEPHONE (OUTPATIENT)
Age: 34
End: 2024-04-18

## 2024-04-18 VITALS
RESPIRATION RATE: 18 BRPM | TEMPERATURE: 98 F | SYSTOLIC BLOOD PRESSURE: 106 MMHG | DIASTOLIC BLOOD PRESSURE: 64 MMHG | HEART RATE: 75 BPM | OXYGEN SATURATION: 98 %

## 2024-04-18 LAB
ANION GAP SERPL CALC-SCNC: 4 MMOL/L (ref 5–15)
BUN SERPL-MCNC: 6 MG/DL (ref 6–20)
BUN/CREAT SERPL: 10 (ref 12–20)
CALCIUM SERPL-MCNC: 8.5 MG/DL (ref 8.5–10.1)
CHLORIDE SERPL-SCNC: 110 MMOL/L (ref 97–108)
CO2 SERPL-SCNC: 22 MMOL/L (ref 21–32)
CREAT SERPL-MCNC: 0.58 MG/DL (ref 0.55–1.02)
ERYTHROCYTE [DISTWIDTH] IN BLOOD BY AUTOMATED COUNT: 12.3 % (ref 11.5–14.5)
GLUCOSE SERPL-MCNC: 112 MG/DL (ref 65–100)
HCT VFR BLD AUTO: 29.9 % (ref 35–47)
HGB BLD-MCNC: 9.9 G/DL (ref 11.5–16)
MCH RBC QN AUTO: 31.3 PG (ref 26–34)
MCHC RBC AUTO-ENTMCNC: 33.1 G/DL (ref 30–36.5)
MCV RBC AUTO: 94.6 FL (ref 80–99)
NRBC # BLD: 0 K/UL (ref 0–0.01)
NRBC BLD-RTO: 0 PER 100 WBC
PLATELET # BLD AUTO: 250 K/UL (ref 150–400)
PMV BLD AUTO: 10.7 FL (ref 8.9–12.9)
POTASSIUM SERPL-SCNC: 3.7 MMOL/L (ref 3.5–5.1)
RBC # BLD AUTO: 3.16 M/UL (ref 3.8–5.2)
SODIUM SERPL-SCNC: 136 MMOL/L (ref 136–145)
WBC # BLD AUTO: 11.1 K/UL (ref 3.6–11)

## 2024-04-18 PROCEDURE — 6370000000 HC RX 637 (ALT 250 FOR IP): Performed by: OBSTETRICS & GYNECOLOGY

## 2024-04-18 PROCEDURE — 59025 FETAL NON-STRESS TEST: CPT

## 2024-04-18 PROCEDURE — A4216 STERILE WATER/SALINE, 10 ML: HCPCS | Performed by: OBSTETRICS & GYNECOLOGY

## 2024-04-18 PROCEDURE — 2500000003 HC RX 250 WO HCPCS: Performed by: OBSTETRICS & GYNECOLOGY

## 2024-04-18 PROCEDURE — 80048 BASIC METABOLIC PNL TOTAL CA: CPT

## 2024-04-18 PROCEDURE — 36415 COLL VENOUS BLD VENIPUNCTURE: CPT

## 2024-04-18 PROCEDURE — 2580000003 HC RX 258: Performed by: OBSTETRICS & GYNECOLOGY

## 2024-04-18 PROCEDURE — 99231 SBSQ HOSP IP/OBS SF/LOW 25: CPT | Performed by: OBSTETRICS & GYNECOLOGY

## 2024-04-18 PROCEDURE — 85027 COMPLETE CBC AUTOMATED: CPT

## 2024-04-18 RX ORDER — PSEUDOEPHEDRINE HCL 30 MG
100 TABLET ORAL 2 TIMES DAILY
Qty: 60 CAPSULE | Refills: 1 | Status: SHIPPED | OUTPATIENT
Start: 2024-04-18

## 2024-04-18 RX ADMIN — DOCUSATE SODIUM 100 MG: 100 CAPSULE, LIQUID FILLED ORAL at 09:34

## 2024-04-18 RX ADMIN — MAGNESIUM HYDROXIDE 30 ML: 400 SUSPENSION ORAL at 09:34

## 2024-04-18 RX ADMIN — FAMOTIDINE 20 MG: 10 INJECTION INTRAVENOUS at 00:21

## 2024-04-18 NOTE — PROGRESS NOTES
7:55- Bedside and Verbal shift change report given to JADEN Rausch RN  (oncoming nurse) and JADEN Chacko (student nurse) by Jayce SKINNER (offgoing nurse). Report included the following information SBAR, Kardex, Intake/Output, MAR, and Recent Results.       1:08- I have reviewed discharge instruction and reviewed medication times. Patient given copy of discharge instructions. Patient verbalizes understanding and denies any further questions at this time. Pt declined wheelchair, walking out with her .

## 2024-04-18 NOTE — DISCHARGE INSTRUCTIONS
.SIMON                   DISCHARGE INFORMATION    Patient: Dc Eduardo MRN: [id number]  SSN: [id number]    YOB: 1990  Age: 33 y.o.  Sex: female              As a part of your procedure you have a ureteral stent in place which maintains proper drainage of your kidney. It bypasses any blockage from a kidney stone, or swelling within the ureter even if the stone was removed. Most patients still experience some discomfort with the stent, but not to the extent which was seen previously.    Pain Medications  You may have been given oral nacotics (Lortab, Percocet, Dilaudid, etc) for pain control. These should be used sparingly. You may not drive, work on these medications. Narcotics are constipating, and are counterproductive to resuming bowel function. When able to use over the counter Tylenol, this is a better choice.         Expected Symptoms from Stent  The stent rubs, so some blood in the urine will be seen. Note that this amount of blood can appear significant but is in reality very little.  When urinating expect to have increased discomfort in the back on the side of the stent. This comes from urine going from the bladder, up the ureter/stent and causing pressure/pain in the kidney area.  The stent can be quite irritating to the bladder and may cause symptoms of frequent/urgent urination or a sudden severe pain over the bladder with the urge to urinate. This is a bladder spasm, and if they are common another medication can help.  Many times strings are left attatched to the stent and come out though the urethra. This allows your doctor to remove the stent when appropriate without another procedure. In men it is usually taped to the penis, or sometime free floating. In women it is tucked in the vagina. Leave this string alone. Avoid all sexual activity with the stent/string.  If you experience fever > 101, uncontrolled pain/nauea/vomiting, or have continuous leakage of urine please contact our

## 2024-04-18 NOTE — TELEPHONE ENCOUNTER
Patient called, name and  verified. Patient is calling as she wants to know if AZO is safe to use. Please advise. 32w3d.

## 2024-04-18 NOTE — ADT AUTH CERT
Progress Notes by Ni Cooley MD at 2024  8:19 AM    Author: Ni Cooley MD Service: Obstetrics & Gynecology Author Type: Physician   Filed: 2024  8:28 AM Date of Service: 2024  8:19 AM Status: Signed   : Ni Cooley MD (Physician)   Expand All Collapse All  Ante Partum Progress Note     Dc Alsawaf  32w2d     Assessment and Plan: 32w2d   Symptomatic left kidney stone: recurrent and necessitating 4 hospital/[facility] presentations in the past week. Not improving with conservative management.   - plan for cystoscopy with stent placement by Urology today; plan for fetal monitoring during and after the procedure  - Urology following, appreciate their help  - IV fluids  - strain urine  - daily NSTs        Orders/Charges: Medium and Non Stress Test     S:  Dc continues to have pain overnight, and did take IV dilaudid. She has not noticed any stone or sediment in her urine.  She denies obstetric complaints.  NPO this morning for Urology procedure.      Vitals:  /71   Pulse 88   Temp 98.5 °F (36.9 °C) (Oral)   Resp 18   LMP 2023 (Exact Date)   SpO2 99%   Temp (24hrs), Av.5 °F (36.9 °C), Min:98.2 °F (36.8 °C), Max:99 °F (37.2 °C)        Last 24hr Input/Output:  No intake or output data in the 24 hours ending 24 0820      Non stress test:   Non-stress test  Indication: left kidney stone  FHR: baseline __ , moderate variability, accelerations present, no decelerations  [place]: contractions not seen  Duration monitored: >20 minutes  Interpretation: reactive and reassuring, Category 1        No data found. No data found.            Exam:  Patient without distress.                Abdomen, fundus soft non-tender, nondistended                Extremities, no redness or tenderness                          Labs:            Lab Results   Component Value Date/Time     WBC 15.9 2024 01:30 PM     WBC 11.7 2024 06:22 AM     WBC 12.4 2024 03:04 AM     WBC 11.5

## 2024-04-18 NOTE — DISCHARGE SUMMARY
Obstetrical Discharge Summary     Name: Dc Eduardo MRN: [id number]  SSN: [id number]    YOB: 1990  Age: 33 y.o.  Sex: female      Admit Date: 4/16/2024    Discharge Date: 4/18/2024     Admitting Physician: Ni Cooley MD     Attending Physician:  Ni Cooley MD     Admission Diagnoses: Kidney stone complicating pregnancy, third trimester [O26.833, N20.0]    Discharge Diagnoses: Same as above    Additional Diagnoses: n/a    Hospital Course: Patient admitted due to refractory pain from left kidney stone. She ultimately had stent placement with urology. Cleared for discharge home at this time. Reassuring fetal status throughout.     Patient Instructions:   Current Discharge Medication List        START taking these medications    Details   docusate sodium (COLACE, DULCOLAX) 100 MG CAPS Take 100 mg by mouth 2 times daily  Qty: 60 capsule, Refills: 1           CONTINUE these medications which have NOT CHANGED    Details   ondansetron (ZOFRAN-ODT) 4 MG disintegrating tablet Take 1 tablet by mouth every 8 hours as needed for Nausea or Vomiting  Qty: 30 tablet, Refills: 0      Prenatal Vit-Fe Fumarate-FA (PRENATAL PO) Take by mouth           STOP taking these medications       oxyCODONE (ROXICODONE) 5 MG immediate release tablet Comments:   Reason for Stopping:               Reference my discharge instructions.    No follow-ups on file.     Signed By:  Silvia Juarez MD     April 18, 2024

## 2024-04-18 NOTE — TELEPHONE ENCOUNTER
Spoke with pt and informed that per provider it is ok to take AZO during pregnancy. She verbalized understanding. Also informed that tomorrow she will come at 2pm for her US and then will see Dr. Cooley at 250. Pt verbalized understanding

## 2024-04-18 NOTE — PROGRESS NOTES
04/18/2024 04:49 AM    K 3.7 04/18/2024 04:49 AM     04/18/2024 04:49 AM    CO2 22 04/18/2024 04:49 AM    BUN 6 04/18/2024 04:49 AM     Cultures: pending     Imaging: N/A  Assessment/Plan:   34 yo F 32 weeks pregnant with left flank pain, mild Left hydronephrosis, 7 mm Left renal stone      1. S/p L ureteral stent - pain managed   - will need to notify Urology once delivered for CT and stent removal   - follow cultures   - OK to [facility] from Urology standpoint     Urology will sign off for now     Signed By: CHANDLER Saucedo - JOSE - April 18, 2024

## 2024-04-18 NOTE — PROGRESS NOTES
Ante Partum Progress Note    Dc Eduardo  32w3d    Assessment and Plan: 32w2d, POD1 s/p cystoscopy, placement of left double-J stent for left renal stone/left hydronephrosis  Symptomatic left kidney stone: recurrent and necessitating 4 hospital/[facility] presentations in the past week. Not improving with conservative management.   - Urology following, appreciate their help  - IV fluids  - strain urine  - daily NSTs  -possibly home today if ongoing clinical improvement    Orders/Charges: Medium and Non Stress Test    S:  Pain improved sp stent placement. Good FM, no LOF, VB or ctx. Otherwise doing well.     Vitals:  /64   Pulse 97   Temp 98.1 °F (36.7 °C) (Oral)   Resp 16   LMP 2023 (Exact Date)   SpO2 96%   Temp (24hrs), Av.4 °F (36.9 °C), Min:98 °F (36.7 °C), Max:99 °F (37.2 °C)      Last 24hr Input/Output:    Intake/Output Summary (Last 24 hours) at 2024 0815  Last data filed at 2024 1156  Gross per 24 hour   Intake 700 ml   Output 0 ml   Net 700 ml        Non stress test:   Non-stress test  Indication: left kidney stone  FHR: baseline 140a, moderate variability, accelerations present, no decelerations  [place]: contractions not seen  Duration monitored: >20 minutes  Interpretation: reactive and reassuring, Category 1    Exam:  Patient without distress.     Abdomen, fundus soft non-tender, nondistended, no CVAT     Extremities, no redness or tenderness             Labs:   Lab Results   Component Value Date/Time    WBC 11.1 2024 04:49 AM    WBC 15.9 2024 01:30 PM    WBC 11.7 2024 06:22 AM    WBC 12.4 2024 03:04 AM    WBC 11.5 2024 12:00 AM    WBC 11.4 2024 12:00 AM    WBC 8.8 2023 10:51 AM    WBC 8.8 2023 12:00 AM    HGB 9.9 2024 04:49 AM    HGB 12.0 2024 01:30 PM    HGB 10.5 2024 06:22 AM    HGB 11.2 2024 03:04 AM    HGB 11.8 2024 12:00 AM    HGB 11.1 2024 12:00 AM    HGB 11.2 2023 10:51 AM    HGB 12.7

## 2024-04-18 NOTE — PROGRESS NOTES
1143 RN in OR to monitor FHR.    1158 Pt transferred out of OR.    1200 Pt transferred to PACU.    1202 Dr. Cooley updated.    1221 Telephone orders received from MD to complete 30 minute NST.    1231 NST complete. MD updated.

## 2024-04-19 ENCOUNTER — ROUTINE PRENATAL (OUTPATIENT)
Age: 34
End: 2024-04-19

## 2024-04-19 VITALS — BODY MASS INDEX: 26.93 KG/M2 | SYSTOLIC BLOOD PRESSURE: 90 MMHG | DIASTOLIC BLOOD PRESSURE: 60 MMHG | WEIGHT: 152 LBS

## 2024-04-19 DIAGNOSIS — Z34.90 PREGNANCY, UNSPECIFIED GESTATIONAL AGE: Primary | ICD-10-CM

## 2024-04-19 LAB
BACTERIA SPEC CULT: NORMAL
SERVICE CMNT-IMP: NORMAL

## 2024-04-19 PROCEDURE — 0502F SUBSEQUENT PRENATAL CARE: CPT | Performed by: OBSTETRICS & GYNECOLOGY

## 2024-04-19 NOTE — PROGRESS NOTES
Overall feels much better than prior to her stent placement.  No severe episodes of pain.  She still does note a little discomfort in the left kidney region and a slight urethral irritation with voiding.  Some blood in her urine the first day of her cysto, not noticing as much today.   Active fetal movements.    Feeling an occas BHC.    Reassured re US results today. Normal EFW 34%ile and JACK 18cm, cephalic.

## 2024-04-19 NOTE — PROGRESS NOTES
Pt being seen for fuv from surgery and having a US fuv  Active FM, denies contractions, LOF and VB    US Report:  LIMITED OB SCAN A SINGLE VERTEX 32W4D IUP IS SEEN. FETAL CARDIAC MOTION OBSERVED. LIMITED ANATOMY WAS VISUALIZED AND APPEARS WNL. EFW= 4 LB 1 OZ ( 33.9 %) JACK= 18.35 CM PLACENTA APPEARS WITHIN NORMAL LIMIT  Placenta Location anterior

## 2024-04-30 ENCOUNTER — NURSE ONLY (OUTPATIENT)
Age: 34
End: 2024-04-30

## 2024-04-30 ENCOUNTER — TELEPHONE (OUTPATIENT)
Age: 34
End: 2024-04-30

## 2024-04-30 ENCOUNTER — HOSPITAL ENCOUNTER (EMERGENCY)
Facility: HOSPITAL | Age: 34
Discharge: HOME | End: 2024-04-30
Payer: COMMERCIAL

## 2024-04-30 VITALS
HEART RATE: 86 BPM | RESPIRATION RATE: 14 BRPM | OXYGEN SATURATION: 99 % | SYSTOLIC BLOOD PRESSURE: 108 MMHG | TEMPERATURE: 97.7 F | DIASTOLIC BLOOD PRESSURE: 70 MMHG

## 2024-04-30 DIAGNOSIS — R30.9 PAINFUL URINATION: Primary | ICD-10-CM

## 2024-04-30 PROCEDURE — 4500000002 HC ER NO CHARGE

## 2024-04-30 PROCEDURE — 99283 EMERGENCY DEPT VISIT LOW MDM: CPT

## 2024-04-30 NOTE — TELEPHONE ENCOUNTER
Spoke with pt, she states that it is hard to distinguish if the pain is related to the UTI or the stent. She denies that this is vaginal bleeding, relays that there is nothing in her underwear and that this only happens when she urinates. States that she is having some abdominal pain  and having some back pain but states that she is not sure if this is just pregnancy related or UTI related-feels sx are overlapping at this point.

## 2024-04-30 NOTE — TELEPHONE ENCOUNTER
Patient called, name and  verified. Patient is calling c/o painful urination and blood in her urine. She reports the blood being significant mimicking her being on her cycle. She took an at home UTI test which was positive. She never got the chance to get established with Urology. A stent was placed on  in the ED. She is coming by this afternoon to drop off a urine sample. Please advise of anything further. She is currently a  and 34w1d. Age is 33.

## 2024-04-30 NOTE — DISCHARGE INSTRUCTIONS
Pregnancy Precautions: Care Instructions  Overview     There is no sure way to prevent labor before your due date ( labor) or to prevent most other pregnancy problems. But there are things you can do to increase your chances of a healthy pregnancy. Go to your appointments, follow your doctor's advice, and take good care of yourself. Eat healthy foods, and exercise (if your doctor agrees). And make sure to drink plenty of water.  Follow-up care is a key part of your treatment and safety. Be sure to make and go to all appointments, and call your doctor if you are having problems. It's also a good idea to know your test results and keep a list of the medicines you take.  How can you care for yourself at home?  Make sure you go to your prenatal appointments. At each visit, your doctor will check your blood pressure and weight. Your doctor will also listen for a fetal heartbeat and measure the size of the uterus.  Drink plenty of fluids. Dehydration can cause contractions. If you have kidney, heart, or liver disease and have to limit fluids, talk with your doctor before you increase the amount of fluids you drink.  Tell your doctor right away if you notice any symptoms of an infection, such as:  Burning when you urinate.  A frequent need to urinate without being able to pass much urine.  A foul-smelling discharge from your vagina.  Vaginal itching.  Unexplained fever.  Unusual pain or soreness in your uterus or lower belly.  Avoid foods that may be harmful.  Don't eat raw meat, deli meat, raw seafood, or raw eggs.  Avoid soft cheese and unpasteurized dairy, like Brie and blue cheese.  Avoid fish that are high in mercury. These include shark, swordfish, denise mackerel, marlin, orange roughy, and bigeye tuna, as well as tilefish from the [place].  If you smoke or vape, quit or cut back as much as you can. Talk to your doctor if you need help quitting.  If you use alcohol, marijuana, or other drugs, quit

## 2024-04-30 NOTE — PROGRESS NOTES
1705-Pt arrived via WC to [facility] (room 12).  Pt of Dr Cooley. States she is having lower abdominal and back pain and has had \"cranberry\" colored urine.  States stent placed approximately 9 days ago. Denies leaking of fluid or vaginal bleeding. States appropriate fetal movement.  Pt left urine sample at Dr Cooley's office.   1725-Dr Rowland notified of patient's arrival.   1736-Dr Rowland at bedside,viewed strip.   SVE done.  Closed.  Dr Rowland to talk to urology.    1814-Dr rowland spoke with urology.  Ok for patient to be discharged home.  Pt to continue PO hydration.  Pt has scheduled appointment with Dr Cooley on Friday.   1826-Discharge instructions given and gone over with patient and .  Pt voiced no questions or concerns. Taken downstairs via WC with .

## 2024-04-30 NOTE — ED PROVIDER NOTES
Dc is a G1 @ 34 1/7 wks who presents to [facility] with CC of yosi hematuria.  She is s/p L ureteral stent  on 4/17 and has had some blood in her urine since then.  Today it turned to \"cranberry\" color.  Her pain is much improved since having the stent placed.  She is having some BH contractions but nothing painful.  She is trying to maintain hydration PO.  She has active FM and no VB.    Primary Provider: Love  G1  EDC by LMP=9 wk US     Pregnancy problems:  Left kidney stones: recurrent and needed 4 hospital presentations. S/p cystoscopy and double J stent placement by Urology on 4/17. Urine culture neg x3  - 32 weeks EFW: 34%ile, JACK 18cm, cephalic     IOB labs: O pos, normal, neg urine cult, neg GC/Chl  Genetic Screening: NIPT low risk GIRL!, 4 panel carrier screening neg. MSAFP neg  Anatomy: GIRL!! Anterior placenta, normal anatomy  Flu: received  COVID booster: received 2023  TDAP: given 3/22  RSV vaccine:  Third Tri Labs: Hgb 11.8, glucola 76  GBS:     Pain mgmt. 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        The history is provided by the patient and medical records.   Urinary Pain         Chief Complaint   Patient presents with    Urinary Pain     Back and abdominal pain        Past Medical History:   Diagnosis Date    Kidney stone        Past Surgical History:   Procedure Laterality Date    CARPAL TUNNEL RELEASE Right     CYSTOSCOPY N/A 4/17/2024    CYSTOSCOPY URETERAL STENT INSERTION/  performed by James Rubio MD at [facility] MA[facility] OR    ORTHOPEDIC SURGERY           Family History   Problem Relation Age of Onset    Thyroid Disease Mother     No Known Problems Paternal Grandfather     Colon Cancer Maternal Grandfather     Breast Cancer Maternal Grandmother        Social History     Socioeconomic History    Marital status:      Spouse name: Not on file    Number of children: Not on file    Years of education: Not on file    Highest

## 2024-05-01 LAB — BACTERIA UR CULT: NORMAL

## 2024-05-03 ENCOUNTER — ROUTINE PRENATAL (OUTPATIENT)
Age: 34
End: 2024-05-03

## 2024-05-03 VITALS — SYSTOLIC BLOOD PRESSURE: 110 MMHG | DIASTOLIC BLOOD PRESSURE: 60 MMHG | BODY MASS INDEX: 26.57 KG/M2 | WEIGHT: 150 LBS

## 2024-05-03 DIAGNOSIS — Z34.90 PREGNANCY, UNSPECIFIED GESTATIONAL AGE: Primary | ICD-10-CM

## 2024-05-03 PROCEDURE — 0502F SUBSEQUENT PRENATAL CARE: CPT | Performed by: OBSTETRICS & GYNECOLOGY

## 2024-05-03 NOTE — PROGRESS NOTES
Doing better, pain from stent is better when resting.   Denies LOF and VB some bhc  Decrease in appetite  Active FM

## 2024-05-03 NOTE — PROGRESS NOTES
Routine prenatal visit today.  Normal fetal movements.  She still has left flank pain if laying on her left side.    Her hematuria as improved, but still somewhat present.  She is still having some suprapubic pain and pain with urination.   Urine culture from earlier in the week returned negative.   Hematuria with the stent is expected per Urology.   Discussed increasing high calorie/protein foods to keep with weight gain.  Discussed starting a PPI for GERD.   GBS and vscan at next visit.

## 2024-05-08 ENCOUNTER — TELEPHONE (OUTPATIENT)
Age: 34
End: 2024-05-08

## 2024-05-08 NOTE — TELEPHONE ENCOUNTER
Patient called, name and  verified. Patient is calling c/o left flank pain with some blood in her urine again. She reports she was seen recently in L&D and was sent home. She is not sure what to do next but thought she should let someone know about it. She also reports some pelvic cramping that mimic menstrual cramps. She denies any LOF or vaginal bleeding. She also reports some +FM. Please let me know what messages to relay to your patient. She is currently a  and 35w2d. Age is 33.

## 2024-05-13 NOTE — TELEPHONE ENCOUNTER
Patient called, name and  verified. Patient is calling as she was recently discharged from the hospital and was given Flagyl for BV while there but was sent home with instructions to take the flagyl for 5 more days. She just wants to clarify if she should still be taking this?     Pt also wanted to see if she needed to be seen before her appt on Friday. She reports that she is still in some slight pain but not intolerable. Please advise.  
Spoke with pt, relayed that per provider the flagyl only needs to be taken a total of 7 days with tomorrow 4/16/2024 being the 7th day. Also informed not medically necessary for her to be seen before her apt Friday unless she feels she needs to be. Pt verbalized understanding and states that she will wait until her apt on Friday  
Home

## 2024-05-15 ENCOUNTER — TELEPHONE (OUTPATIENT)
Age: 34
End: 2024-05-15

## 2024-05-15 NOTE — TELEPHONE ENCOUNTER
Patient called, name and  verified. Patient is calling c/o some period cramps with no regularity or timeable intervals. She also reports some lower back pain. She denies any LOF, losing of mucous plug or vaginal bleeding. She reports good FM. We discussed monitoring at home and good hydration. Labor and bleeding precautions have been provided. Pt has an upcoming appt scheduled this Friday. She has been told to call back with any changes. Please let me know what messages you would like relayed to your pt.  She is currently a  and 36w2d.

## 2024-05-17 ENCOUNTER — ROUTINE PRENATAL (OUTPATIENT)
Age: 34
End: 2024-05-17

## 2024-05-17 VITALS — BODY MASS INDEX: 26.93 KG/M2 | SYSTOLIC BLOOD PRESSURE: 108 MMHG | WEIGHT: 152 LBS | DIASTOLIC BLOOD PRESSURE: 62 MMHG

## 2024-05-17 DIAGNOSIS — Z34.90 PREGNANCY, UNSPECIFIED GESTATIONAL AGE: Primary | ICD-10-CM

## 2024-05-17 PROCEDURE — 0502F SUBSEQUENT PRENATAL CARE: CPT | Performed by: OBSTETRICS & GYNECOLOGY

## 2024-05-17 RX ORDER — OMEPRAZOLE 10 MG/1
10 CAPSULE, DELAYED RELEASE ORAL DAILY
COMMUNITY

## 2024-05-17 NOTE — PROGRESS NOTES
Feeling well overall, but having consistent hematuria, she is worried by the amount of blood she is losing. Per Urology, no management is needed unless she develops a fever or cannot completely empty her bladder.   Active FM.   More BHC  Vscan and GBS today.   Check CBC today for Hgb given her bleeding.    Add growth scan to next OB visit for S<D

## 2024-05-17 NOTE — PROGRESS NOTES
Doing well, still having a lot of blood when urinating, relays that uro is not concerned but wants to make sure she is not losing too much  Stronger BHC, denies LOF and VB  Active FM  GBS v-scan  Accepts cervical check

## 2024-05-18 LAB
ERYTHROCYTE [DISTWIDTH] IN BLOOD BY AUTOMATED COUNT: 12.3 % (ref 11.7–15.4)
HCT VFR BLD AUTO: 33.8 % (ref 34–46.6)
HGB BLD-MCNC: 11.2 G/DL (ref 11.1–15.9)
MCH RBC QN AUTO: 30.9 PG (ref 26.6–33)
MCHC RBC AUTO-ENTMCNC: 33.1 G/DL (ref 31.5–35.7)
MCV RBC AUTO: 93 FL (ref 79–97)
PLATELET # BLD AUTO: 258 X10E3/UL (ref 150–450)
RBC # BLD AUTO: 3.62 X10E6/UL (ref 3.77–5.28)
WBC # BLD AUTO: 14.4 X10E3/UL (ref 3.4–10.8)

## 2024-05-19 LAB — GP B STREP DNA SPEC QL NAA+PROBE: NEGATIVE

## 2024-05-21 ENCOUNTER — ROUTINE PRENATAL (OUTPATIENT)
Age: 34
End: 2024-05-21

## 2024-05-21 VITALS — SYSTOLIC BLOOD PRESSURE: 112 MMHG | BODY MASS INDEX: 27.1 KG/M2 | WEIGHT: 153 LBS | DIASTOLIC BLOOD PRESSURE: 66 MMHG

## 2024-05-21 DIAGNOSIS — Z34.90 PREGNANCY, UNSPECIFIED GESTATIONAL AGE: ICD-10-CM

## 2024-05-21 DIAGNOSIS — Z34.03 ENCOUNTER FOR SUPERVISION OF NORMAL FIRST PREGNANCY IN THIRD TRIMESTER: Primary | ICD-10-CM

## 2024-05-21 DIAGNOSIS — R30.0 DYSURIA: ICD-10-CM

## 2024-05-21 PROCEDURE — 0502F SUBSEQUENT PRENATAL CARE: CPT | Performed by: OBSTETRICS & GYNECOLOGY

## 2024-05-21 NOTE — PROGRESS NOTES
Doing well, no major changes in the past week.  Hgb was normal.   GBS neg.   Check urine culture today, increased suprapubic pain, still occasional gross hematuria.    Reviewed ultrasound findings today, normal EFW and JACK.

## 2024-05-23 LAB — BACTERIA UR CULT: NO GROWTH

## 2024-05-31 ENCOUNTER — ROUTINE PRENATAL (OUTPATIENT)
Age: 34
End: 2024-05-31

## 2024-05-31 VITALS — SYSTOLIC BLOOD PRESSURE: 108 MMHG | BODY MASS INDEX: 27.28 KG/M2 | DIASTOLIC BLOOD PRESSURE: 66 MMHG | WEIGHT: 154 LBS

## 2024-05-31 DIAGNOSIS — Z34.90 PREGNANCY, UNSPECIFIED GESTATIONAL AGE: Primary | ICD-10-CM

## 2024-05-31 PROCEDURE — 0502F SUBSEQUENT PRENATAL CARE: CPT | Performed by: OBSTETRICS & GYNECOLOGY

## 2024-05-31 NOTE — PROGRESS NOTES
Routine prenatal visit today.  Normal fetal movements.  Still feeling bladder pain with pelvic pressure from baby's head and unchanged hematuria.    Occas [facility].    Reviewed IOL procedure next week.

## 2024-06-03 ENCOUNTER — HOSPITAL ENCOUNTER (INPATIENT)
Facility: HOSPITAL | Age: 34
LOS: 4 days | Discharge: HOME | End: 2024-06-07
Attending: OBSTETRICS & GYNECOLOGY | Admitting: OBSTETRICS & GYNECOLOGY
Payer: COMMERCIAL

## 2024-06-03 PROBLEM — Z3A.39 39 WEEKS GESTATION OF PREGNANCY: Status: ACTIVE | Noted: 2024-06-03

## 2024-06-03 PROBLEM — Z34.90 ENCOUNTER FOR INDUCTION OF LABOR: Status: ACTIVE | Noted: 2024-06-03

## 2024-06-03 LAB
ABO + RH BLD: NORMAL
ALBUMIN SERPL-MCNC: 2.5 G/DL (ref 3.5–5)
ALBUMIN/GLOB SERPL: 0.7 (ref 1.1–2.2)
ALP SERPL-CCNC: 114 U/L (ref 45–117)
ALT SERPL-CCNC: 14 U/L (ref 12–78)
ANION GAP SERPL CALC-SCNC: 7 MMOL/L (ref 5–15)
AST SERPL-CCNC: 16 U/L (ref 15–37)
BILIRUB SERPL-MCNC: 0.3 MG/DL (ref 0.2–1)
BLOOD GROUP ANTIBODIES SERPL: NORMAL
BUN SERPL-MCNC: 9 MG/DL (ref 6–20)
BUN/CREAT SERPL: 13 (ref 12–20)
CALCIUM SERPL-MCNC: 9.2 MG/DL (ref 8.5–10.1)
CHLORIDE SERPL-SCNC: 108 MMOL/L (ref 97–108)
CO2 SERPL-SCNC: 22 MMOL/L (ref 21–32)
CREAT SERPL-MCNC: 0.7 MG/DL (ref 0.55–1.02)
ERYTHROCYTE [DISTWIDTH] IN BLOOD BY AUTOMATED COUNT: 13.1 % (ref 11.5–14.5)
GLOBULIN SER CALC-MCNC: 3.7 G/DL (ref 2–4)
GLUCOSE SERPL-MCNC: 97 MG/DL (ref 65–100)
HCT VFR BLD AUTO: 32.7 % (ref 35–47)
HGB BLD-MCNC: 11 G/DL (ref 11.5–16)
MCH RBC QN AUTO: 30.9 PG (ref 26–34)
MCHC RBC AUTO-ENTMCNC: 33.6 G/DL (ref 30–36.5)
MCV RBC AUTO: 91.9 FL (ref 80–99)
NRBC # BLD: 0 K/UL (ref 0–0.01)
NRBC BLD-RTO: 0 PER 100 WBC
PLATELET # BLD AUTO: 249 K/UL (ref 150–400)
PMV BLD AUTO: 11.1 FL (ref 8.9–12.9)
POTASSIUM SERPL-SCNC: 3.8 MMOL/L (ref 3.5–5.1)
PROT SERPL-MCNC: 6.2 G/DL (ref 6.4–8.2)
RBC # BLD AUTO: 3.56 M/UL (ref 3.8–5.2)
SODIUM SERPL-SCNC: 137 MMOL/L (ref 136–145)
SPECIMEN EXP DATE BLD: NORMAL
WBC # BLD AUTO: 14.1 K/UL (ref 3.6–11)

## 2024-06-03 PROCEDURE — 3E0DXGC INTRODUCTION OF OTHER THERAPEUTIC SUBSTANCE INTO MOUTH AND PHARYNX, EXTERNAL APPROACH: ICD-10-PCS | Performed by: OBSTETRICS & GYNECOLOGY

## 2024-06-03 PROCEDURE — APPNB15 APP NON BILLABLE TIME 0-15 MINS

## 2024-06-03 PROCEDURE — 7210000100 HC LABOR FEE PER 1 HR

## 2024-06-03 PROCEDURE — 59200 INSERT CERVICAL DILATOR: CPT

## 2024-06-03 PROCEDURE — 85027 COMPLETE CBC AUTOMATED: CPT

## 2024-06-03 PROCEDURE — 36415 COLL VENOUS BLD VENIPUNCTURE: CPT

## 2024-06-03 PROCEDURE — 80053 COMPREHEN METABOLIC PANEL: CPT

## 2024-06-03 PROCEDURE — 6370000000 HC RX 637 (ALT 250 FOR IP)

## 2024-06-03 PROCEDURE — 86850 RBC ANTIBODY SCREEN: CPT

## 2024-06-03 PROCEDURE — 1100000000 HC RM PRIVATE

## 2024-06-03 PROCEDURE — 86901 BLOOD TYPING SEROLOGIC RH(D): CPT

## 2024-06-03 PROCEDURE — 86900 BLOOD TYPING SEROLOGIC ABO: CPT

## 2024-06-03 RX ORDER — FENTANYL/BUPIVACAINE/NS/PF 2-1250MCG
1-15 PLASTIC BAG, INJECTION (ML) INJECTION CONTINUOUS
Status: DISCONTINUED | OUTPATIENT
Start: 2024-06-03 | End: 2024-06-03

## 2024-06-03 RX ORDER — HYDROMORPHONE HYDROCHLORIDE 1 MG/ML
1 INJECTION, SOLUTION INTRAMUSCULAR; INTRAVENOUS; SUBCUTANEOUS EVERY 4 HOURS PRN
Status: DISCONTINUED | OUTPATIENT
Start: 2024-06-03 | End: 2024-06-03

## 2024-06-03 RX ORDER — SODIUM CHLORIDE 9 MG/ML
25 INJECTION, SOLUTION INTRAVENOUS PRN
Status: DISCONTINUED | OUTPATIENT
Start: 2024-06-03 | End: 2024-06-03

## 2024-06-03 RX ORDER — SEVOFLURANE 250 ML/250ML
1 LIQUID RESPIRATORY (INHALATION) CONTINUOUS PRN
Status: DISCONTINUED | OUTPATIENT
Start: 2024-06-03 | End: 2024-06-07 | Stop reason: HOSPADM

## 2024-06-03 RX ORDER — SODIUM CHLORIDE, SODIUM LACTATE, POTASSIUM CHLORIDE, CALCIUM CHLORIDE 600; 310; 30; 20 MG/100ML; MG/100ML; MG/100ML; MG/100ML
INJECTION, SOLUTION INTRAVENOUS CONTINUOUS
Status: DISCONTINUED | OUTPATIENT
Start: 2024-06-03 | End: 2024-06-07 | Stop reason: HOSPADM

## 2024-06-03 RX ORDER — ACETAMINOPHEN 500 MG
1000 TABLET ORAL EVERY 6 HOURS PRN
Status: DISCONTINUED | OUTPATIENT
Start: 2024-06-03 | End: 2024-06-05

## 2024-06-03 RX ORDER — ZOLPIDEM TARTRATE 5 MG/1
5 TABLET ORAL NIGHTLY PRN
Status: DISCONTINUED | OUTPATIENT
Start: 2024-06-03 | End: 2024-06-07 | Stop reason: HOSPADM

## 2024-06-03 RX ORDER — ONDANSETRON 2 MG/ML
4 INJECTION INTRAMUSCULAR; INTRAVENOUS EVERY 6 HOURS PRN
Status: DISCONTINUED | OUTPATIENT
Start: 2024-06-03 | End: 2024-06-03

## 2024-06-03 RX ORDER — CARBOPROST TROMETHAMINE 250 UG/ML
250 INJECTION, SOLUTION INTRAMUSCULAR PRN
Status: DISCONTINUED | OUTPATIENT
Start: 2024-06-03 | End: 2024-06-03

## 2024-06-03 RX ORDER — MISOPROSTOL 200 UG/1
400 TABLET ORAL PRN
Status: DISCONTINUED | OUTPATIENT
Start: 2024-06-03 | End: 2024-06-05

## 2024-06-03 RX ORDER — FENTANYL CITRATE 50 UG/ML
25 INJECTION, SOLUTION INTRAMUSCULAR; INTRAVENOUS
Status: DISCONTINUED | OUTPATIENT
Start: 2024-06-03 | End: 2024-06-03

## 2024-06-03 RX ORDER — SODIUM CHLORIDE, SODIUM LACTATE, POTASSIUM CHLORIDE, AND CALCIUM CHLORIDE .6; .31; .03; .02 G/100ML; G/100ML; G/100ML; G/100ML
500 INJECTION, SOLUTION INTRAVENOUS PRN
Status: DISCONTINUED | OUTPATIENT
Start: 2024-06-03 | End: 2024-06-03

## 2024-06-03 RX ORDER — ONDANSETRON 4 MG/1
4 TABLET, ORALLY DISINTEGRATING ORAL EVERY 6 HOURS PRN
Status: DISCONTINUED | OUTPATIENT
Start: 2024-06-03 | End: 2024-06-03

## 2024-06-03 RX ORDER — SODIUM CHLORIDE 0.9 % (FLUSH) 0.9 %
5-40 SYRINGE (ML) INJECTION EVERY 12 HOURS SCHEDULED
Status: DISCONTINUED | OUTPATIENT
Start: 2024-06-03 | End: 2024-06-03

## 2024-06-03 RX ORDER — ONDANSETRON 2 MG/ML
4 INJECTION INTRAMUSCULAR; INTRAVENOUS EVERY 6 HOURS PRN
Status: CANCELLED | OUTPATIENT
Start: 2024-06-03

## 2024-06-03 RX ORDER — METHYLERGONOVINE MALEATE 0.2 MG/ML
200 INJECTION INTRAVENOUS PRN
Status: DISCONTINUED | OUTPATIENT
Start: 2024-06-03 | End: 2024-06-03

## 2024-06-03 RX ORDER — SODIUM CHLORIDE, SODIUM LACTATE, POTASSIUM CHLORIDE, AND CALCIUM CHLORIDE .6; .31; .03; .02 G/100ML; G/100ML; G/100ML; G/100ML
1000 INJECTION, SOLUTION INTRAVENOUS PRN
Status: DISCONTINUED | OUTPATIENT
Start: 2024-06-03 | End: 2024-06-07 | Stop reason: HOSPADM

## 2024-06-03 RX ORDER — ONDANSETRON 2 MG/ML
4 INJECTION INTRAMUSCULAR; INTRAVENOUS EVERY 6 HOURS PRN
Status: DISCONTINUED | OUTPATIENT
Start: 2024-06-03 | End: 2024-06-03 | Stop reason: SDUPTHER

## 2024-06-03 RX ORDER — TERBUTALINE SULFATE 1 MG/ML
0.25 INJECTION, SOLUTION SUBCUTANEOUS
Status: DISCONTINUED | OUTPATIENT
Start: 2024-06-03 | End: 2024-06-03

## 2024-06-03 RX ORDER — DIPHENHYDRAMINE HCL 25 MG
25 CAPSULE ORAL EVERY 4 HOURS PRN
Status: DISCONTINUED | OUTPATIENT
Start: 2024-06-03 | End: 2024-06-03 | Stop reason: SDUPTHER

## 2024-06-03 RX ORDER — FENTANYL/BUPIVACAINE/NS/PF 2-1250MCG
1-15 PLASTIC BAG, INJECTION (ML) INJECTION CONTINUOUS
Status: CANCELLED | OUTPATIENT
Start: 2024-06-03

## 2024-06-03 RX ORDER — ONDANSETRON 2 MG/ML
4 INJECTION INTRAMUSCULAR; INTRAVENOUS EVERY 6 HOURS PRN
Status: DISCONTINUED | OUTPATIENT
Start: 2024-06-03 | End: 2024-06-07 | Stop reason: HOSPADM

## 2024-06-03 RX ORDER — DIPHENHYDRAMINE HCL 25 MG
25 CAPSULE ORAL EVERY 4 HOURS PRN
Status: CANCELLED | OUTPATIENT
Start: 2024-06-03

## 2024-06-03 RX ORDER — FENTANYL CITRATE 50 UG/ML
100 INJECTION, SOLUTION INTRAMUSCULAR; INTRAVENOUS
Status: DISCONTINUED | OUTPATIENT
Start: 2024-06-03 | End: 2024-06-07

## 2024-06-03 RX ORDER — ESOMEPRAZOLE MAGNESIUM 20 MG/1
20 GRANULE, DELAYED RELEASE ORAL DAILY
Status: ON HOLD | COMMUNITY
End: 2024-06-07 | Stop reason: HOSPADM

## 2024-06-03 RX ORDER — CARBOPROST TROMETHAMINE 250 UG/ML
250 INJECTION, SOLUTION INTRAMUSCULAR PRN
Status: DISCONTINUED | OUTPATIENT
Start: 2024-06-03 | End: 2024-06-07 | Stop reason: HOSPADM

## 2024-06-03 RX ORDER — DIPHENHYDRAMINE HCL 25 MG
25 CAPSULE ORAL EVERY 4 HOURS PRN
Status: DISCONTINUED | OUTPATIENT
Start: 2024-06-03 | End: 2024-06-03

## 2024-06-03 RX ORDER — NALOXONE HYDROCHLORIDE 0.4 MG/ML
INJECTION, SOLUTION INTRAMUSCULAR; INTRAVENOUS; SUBCUTANEOUS PRN
Status: DISCONTINUED | OUTPATIENT
Start: 2024-06-03 | End: 2024-06-03

## 2024-06-03 RX ORDER — DIPHENHYDRAMINE HCL 25 MG
25 CAPSULE ORAL EVERY 4 HOURS PRN
Status: DISCONTINUED | OUTPATIENT
Start: 2024-06-03 | End: 2024-06-07 | Stop reason: HOSPADM

## 2024-06-03 RX ORDER — SODIUM CHLORIDE 0.9 % (FLUSH) 0.9 %
5-40 SYRINGE (ML) INJECTION PRN
Status: DISCONTINUED | OUTPATIENT
Start: 2024-06-03 | End: 2024-06-07 | Stop reason: HOSPADM

## 2024-06-03 RX ORDER — TERBUTALINE SULFATE 1 MG/ML
0.25 INJECTION, SOLUTION SUBCUTANEOUS
Status: ACTIVE | OUTPATIENT
Start: 2024-06-03 | End: 2024-06-04

## 2024-06-03 RX ORDER — SODIUM CHLORIDE, SODIUM LACTATE, POTASSIUM CHLORIDE, AND CALCIUM CHLORIDE .6; .31; .03; .02 G/100ML; G/100ML; G/100ML; G/100ML
500 INJECTION, SOLUTION INTRAVENOUS PRN
Status: DISCONTINUED | OUTPATIENT
Start: 2024-06-03 | End: 2024-06-07 | Stop reason: HOSPADM

## 2024-06-03 RX ORDER — MISOPROSTOL 200 UG/1
400 TABLET ORAL PRN
Status: DISCONTINUED | OUTPATIENT
Start: 2024-06-03 | End: 2024-06-03

## 2024-06-03 RX ORDER — SODIUM CHLORIDE 0.9 % (FLUSH) 0.9 %
5-40 SYRINGE (ML) INJECTION PRN
Status: DISCONTINUED | OUTPATIENT
Start: 2024-06-03 | End: 2024-06-03

## 2024-06-03 RX ORDER — ACETAMINOPHEN 325 MG/1
650 TABLET ORAL EVERY 6 HOURS PRN
Status: DISCONTINUED | OUTPATIENT
Start: 2024-06-03 | End: 2024-06-03

## 2024-06-03 RX ORDER — LIDOCAINE HYDROCHLORIDE 10 MG/ML
30 INJECTION, SOLUTION EPIDURAL; INFILTRATION; INTRACAUDAL; PERINEURAL PRN
Status: DISCONTINUED | OUTPATIENT
Start: 2024-06-03 | End: 2024-06-07 | Stop reason: HOSPADM

## 2024-06-03 RX ORDER — NALOXONE HYDROCHLORIDE 0.4 MG/ML
INJECTION, SOLUTION INTRAMUSCULAR; INTRAVENOUS; SUBCUTANEOUS PRN
Status: CANCELLED | OUTPATIENT
Start: 2024-06-03

## 2024-06-03 RX ORDER — ONDANSETRON 4 MG/1
4 TABLET, ORALLY DISINTEGRATING ORAL EVERY 6 HOURS PRN
Status: DISCONTINUED | OUTPATIENT
Start: 2024-06-03 | End: 2024-06-05

## 2024-06-03 RX ORDER — METHYLERGONOVINE MALEATE 0.2 MG/ML
200 INJECTION INTRAVENOUS PRN
Status: DISCONTINUED | OUTPATIENT
Start: 2024-06-03 | End: 2024-06-07 | Stop reason: HOSPADM

## 2024-06-03 RX ORDER — SODIUM CHLORIDE, SODIUM LACTATE, POTASSIUM CHLORIDE, AND CALCIUM CHLORIDE .6; .31; .03; .02 G/100ML; G/100ML; G/100ML; G/100ML
1000 INJECTION, SOLUTION INTRAVENOUS PRN
Status: DISCONTINUED | OUTPATIENT
Start: 2024-06-03 | End: 2024-06-03

## 2024-06-03 RX ORDER — SODIUM CHLORIDE, SODIUM LACTATE, POTASSIUM CHLORIDE, CALCIUM CHLORIDE 600; 310; 30; 20 MG/100ML; MG/100ML; MG/100ML; MG/100ML
INJECTION, SOLUTION INTRAVENOUS CONTINUOUS
Status: DISCONTINUED | OUTPATIENT
Start: 2024-06-03 | End: 2024-06-03

## 2024-06-03 RX ORDER — EPHEDRINE SULFATE 50 MG/ML
10 INJECTION INTRAVENOUS
Status: DISCONTINUED | OUTPATIENT
Start: 2024-06-03 | End: 2024-06-03

## 2024-06-03 RX ORDER — SODIUM CHLORIDE 9 MG/ML
INJECTION, SOLUTION INTRAVENOUS PRN
Status: DISCONTINUED | OUTPATIENT
Start: 2024-06-03 | End: 2024-06-07 | Stop reason: HOSPADM

## 2024-06-03 RX ORDER — SODIUM CHLORIDE 0.9 % (FLUSH) 0.9 %
5-40 SYRINGE (ML) INJECTION EVERY 12 HOURS SCHEDULED
Status: DISCONTINUED | OUTPATIENT
Start: 2024-06-03 | End: 2024-06-07 | Stop reason: HOSPADM

## 2024-06-03 RX ADMIN — ZOLPIDEM TARTRATE 5 MG: 5 TABLET ORAL at 22:20

## 2024-06-03 RX ADMIN — Medication 25 MCG: at 20:42

## 2024-06-03 RX ADMIN — ACETAMINOPHEN 1000 MG: 500 TABLET ORAL at 21:25

## 2024-06-03 NOTE — H&P
admission.    REVIEW OF SYSTEMS:    CONSTITUTIONAL:  negative  RESPIRATORY:  negative  CARDIOVASCULAR:  negative  GASTROINTESTINAL:  negative  ALLERGIC/IMMUNOLOGIC:  negative  NEUROLOGICAL:  negative  BEHAVIOR/PSYCH:  negative    PHYSICAL EXAM:  There were no vitals filed for this visit.  General appearance:  awake, alert, cooperative, no apparent distress, and appears stated age  Neurologic:  Awake, alert, oriented to name, place and time.    Lungs:  No increased work of breathing, good air exchange  Abdomen:  Soft, non tender, gravid, consistent with her gestational age    Cervix:  0.5/30/-3 in office     Membranes:  Intact    Labs:     No results found for this visit on 06/03/24.    ASSESSMENT AND PLAN:      Principal Problem:    Encounter for induction of labor  Left kidney/ureteral stones with stent placed during pregnancy  Hematuria  GBS neg    Plan:   Initiate induction with cervical ripening. Plan buccal cytotec 25mcg every 4 hours x 6 doses. Attempt cook catheter placement after a few cytotec doses if cervix not amenable to placement on admission.   Pain management as desired.  Will consult Urology tomorrow for consideration of removal of her ureteral stent while she is admitted after delivery.       Ni Cooley MD, 6/3/2024

## 2024-06-04 ENCOUNTER — ANESTHESIA EVENT (OUTPATIENT)
Facility: HOSPITAL | Age: 34
End: 2024-06-04
Payer: COMMERCIAL

## 2024-06-04 ENCOUNTER — ANESTHESIA (OUTPATIENT)
Facility: HOSPITAL | Age: 34
End: 2024-06-04
Payer: COMMERCIAL

## 2024-06-04 PROCEDURE — 2580000003 HC RX 258: Performed by: OBSTETRICS & GYNECOLOGY

## 2024-06-04 PROCEDURE — 2500000003 HC RX 250 WO HCPCS: Performed by: ANESTHESIOLOGY

## 2024-06-04 PROCEDURE — 3700000025 EPIDURAL BLOCK: Performed by: ANESTHESIOLOGY

## 2024-06-04 PROCEDURE — 7210000100 HC LABOR FEE PER 1 HR

## 2024-06-04 PROCEDURE — 2580000003 HC RX 258

## 2024-06-04 PROCEDURE — 6360000002 HC RX W HCPCS: Performed by: ANESTHESIOLOGY

## 2024-06-04 PROCEDURE — 1100000000 HC RM PRIVATE

## 2024-06-04 PROCEDURE — 6370000000 HC RX 637 (ALT 250 FOR IP)

## 2024-06-04 PROCEDURE — 2500000003 HC RX 250 WO HCPCS

## 2024-06-04 PROCEDURE — 51701 INSERT BLADDER CATHETER: CPT

## 2024-06-04 PROCEDURE — 00HU33Z INSERTION OF INFUSION DEVICE INTO SPINAL CANAL, PERCUTANEOUS APPROACH: ICD-10-PCS | Performed by: ANESTHESIOLOGY

## 2024-06-04 PROCEDURE — 6360000002 HC RX W HCPCS

## 2024-06-04 PROCEDURE — 6360000002 HC RX W HCPCS: Performed by: OBSTETRICS & GYNECOLOGY

## 2024-06-04 RX ORDER — LIDOCAINE HCL/EPINEPHRINE/PF 2%-1:200K
VIAL (ML) INJECTION PRN
Status: DISCONTINUED | OUTPATIENT
Start: 2024-06-04 | End: 2024-06-05 | Stop reason: SDUPTHER

## 2024-06-04 RX ORDER — FENTANYL/BUPIVACAINE/NS/PF 2-1250MCG
1-15 PLASTIC BAG, INJECTION (ML) INJECTION CONTINUOUS
Status: DISCONTINUED | OUTPATIENT
Start: 2024-06-04 | End: 2024-06-07

## 2024-06-04 RX ORDER — HYDROMORPHONE HYDROCHLORIDE 1 MG/ML
1 INJECTION, SOLUTION INTRAMUSCULAR; INTRAVENOUS; SUBCUTANEOUS
Status: DISCONTINUED | OUTPATIENT
Start: 2024-06-04 | End: 2024-06-07 | Stop reason: HOSPADM

## 2024-06-04 RX ORDER — BUPIVACAINE HYDROCHLORIDE 5 MG/ML
INJECTION, SOLUTION EPIDURAL; INTRACAUDAL
Status: COMPLETED
Start: 2024-06-04 | End: 2024-06-04

## 2024-06-04 RX ORDER — BUPIVACAINE HYDROCHLORIDE 2.5 MG/ML
INJECTION, SOLUTION EPIDURAL; INFILTRATION; INTRACAUDAL
Status: COMPLETED
Start: 2024-06-04 | End: 2024-06-04

## 2024-06-04 RX ORDER — LIDOCAINE HCL/EPINEPHRINE/PF 2%-1:200K
VIAL (ML) INJECTION
Status: COMPLETED
Start: 2024-06-04 | End: 2024-06-04

## 2024-06-04 RX ORDER — BUPIVACAINE HYDROCHLORIDE 5 MG/ML
INJECTION, SOLUTION EPIDURAL; INTRACAUDAL PRN
Status: DISCONTINUED | OUTPATIENT
Start: 2024-06-04 | End: 2024-06-05 | Stop reason: SDUPTHER

## 2024-06-04 RX ORDER — NALOXONE HYDROCHLORIDE 0.4 MG/ML
INJECTION, SOLUTION INTRAMUSCULAR; INTRAVENOUS; SUBCUTANEOUS PRN
Status: DISCONTINUED | OUTPATIENT
Start: 2024-06-04 | End: 2024-06-07 | Stop reason: HOSPADM

## 2024-06-04 RX ORDER — EPHEDRINE SULFATE 50 MG/ML
INJECTION INTRAVENOUS
Status: COMPLETED
Start: 2024-06-04 | End: 2024-06-04

## 2024-06-04 RX ORDER — BUPIVACAINE HYDROCHLORIDE 2.5 MG/ML
INJECTION, SOLUTION EPIDURAL; INFILTRATION; INTRACAUDAL PRN
Status: DISCONTINUED | OUTPATIENT
Start: 2024-06-04 | End: 2024-06-05 | Stop reason: SDUPTHER

## 2024-06-04 RX ORDER — FENTANYL CITRATE 50 UG/ML
INJECTION, SOLUTION INTRAMUSCULAR; INTRAVENOUS PRN
Status: DISCONTINUED | OUTPATIENT
Start: 2024-06-04 | End: 2024-06-05 | Stop reason: SDUPTHER

## 2024-06-04 RX ADMIN — HYDROMORPHONE HYDROCHLORIDE 1 MG: 1 INJECTION, SOLUTION INTRAMUSCULAR; INTRAVENOUS; SUBCUTANEOUS at 06:31

## 2024-06-04 RX ADMIN — HYDROMORPHONE HYDROCHLORIDE 1 MG: 1 INJECTION, SOLUTION INTRAMUSCULAR; INTRAVENOUS; SUBCUTANEOUS at 02:30

## 2024-06-04 RX ADMIN — Medication 25 MCG: at 01:35

## 2024-06-04 RX ADMIN — OXYTOCIN 4 MILLI-UNITS/MIN: 10 INJECTION, SOLUTION INTRAMUSCULAR; INTRAVENOUS at 15:49

## 2024-06-04 RX ADMIN — SODIUM CHLORIDE, PRESERVATIVE FREE 10 ML: 5 INJECTION INTRAVENOUS at 08:13

## 2024-06-04 RX ADMIN — Medication 25 MCG: at 06:16

## 2024-06-04 RX ADMIN — SODIUM CHLORIDE, POTASSIUM CHLORIDE, SODIUM LACTATE AND CALCIUM CHLORIDE: 600; 310; 30; 20 INJECTION, SOLUTION INTRAVENOUS at 17:39

## 2024-06-04 RX ADMIN — EPHEDRINE SULFATE 10 MG: 50 INJECTION INTRAVENOUS at 13:43

## 2024-06-04 RX ADMIN — LIDOCAINE HYDROCHLORIDE AND EPINEPHRINE 5 ML: 20; 5 INJECTION, SOLUTION EPIDURAL; INFILTRATION; INTRACAUDAL; PERINEURAL at 13:30

## 2024-06-04 RX ADMIN — SODIUM CHLORIDE, POTASSIUM CHLORIDE, SODIUM LACTATE AND CALCIUM CHLORIDE: 600; 310; 30; 20 INJECTION, SOLUTION INTRAVENOUS at 10:51

## 2024-06-04 RX ADMIN — HYDROMORPHONE HYDROCHLORIDE 1 MG: 1 INJECTION, SOLUTION INTRAMUSCULAR; INTRAVENOUS; SUBCUTANEOUS at 10:14

## 2024-06-04 RX ADMIN — ONDANSETRON 4 MG: 2 INJECTION INTRAMUSCULAR; INTRAVENOUS at 20:31

## 2024-06-04 RX ADMIN — BUPIVACAINE HYDROCHLORIDE 6 ML: 2.5 INJECTION, SOLUTION EPIDURAL; INFILTRATION; INTRACAUDAL; PERINEURAL at 23:37

## 2024-06-04 RX ADMIN — SODIUM CHLORIDE, POTASSIUM CHLORIDE, SODIUM LACTATE AND CALCIUM CHLORIDE: 600; 310; 30; 20 INJECTION, SOLUTION INTRAVENOUS at 13:40

## 2024-06-04 RX ADMIN — Medication 10 ML/HR: at 21:30

## 2024-06-04 RX ADMIN — OXYTOCIN 1 MILLI-UNITS/MIN: 10 INJECTION, SOLUTION INTRAMUSCULAR; INTRAVENOUS at 14:44

## 2024-06-04 RX ADMIN — Medication 10 ML/HR: at 13:50

## 2024-06-04 RX ADMIN — BUPIVACAINE HYDROCHLORIDE 5 ML: 5 INJECTION, SOLUTION EPIDURAL; INTRACAUDAL; PERINEURAL at 13:33

## 2024-06-04 RX ADMIN — FENTANYL CITRATE 100 MCG: 50 INJECTION, SOLUTION INTRAMUSCULAR; INTRAVENOUS at 13:33

## 2024-06-04 NOTE — ANESTHESIA PRE PROCEDURE
Department of Anesthesiology  Preprocedure Note       Name:  Dc Eduardo   Age:  33 y.o.  :  1990                                          MRN:  [id number]         Date:  2024      Surgeon: * No surgeons listed *    Procedure: * No procedures listed *    Medications prior to admission:   Prior to Admission medications    Medication Sig Start Date End Date Taking? Authorizing Provider   esomeprazole Magnesium (NEXIUM) 20 MG PACK Take 1 packet by mouth daily   Yes Mari Fermin MD   omeprazole (PRILOSEC) 10 MG delayed release capsule Take 1 capsule by mouth daily  Patient not taking: Reported on 6/3/2024    Mari Fermin MD   docusate sodium (COLACE, DULCOLAX) 100 MG CAPS Take 100 mg by mouth 2 times daily  Patient not taking: Reported on 6/3/2024 4/18/24   Silvia Juarez MD   ondansetron (ZOFRAN-ODT) 4 MG disintegrating tablet Take 1 tablet by mouth every 8 hours as needed for Nausea or Vomiting  Patient not taking: Reported on 6/3/2024 4/14/24   Ariane Johnson MD   Prenatal Vit-Fe Fumarate-FA (PRENATAL PO) Take 1 tablet by mouth daily    ProviderMari MD       Current medications:    Current Facility-Administered Medications   Medication Dose Route Frequency Provider Last Rate Last Admin    HYDROmorphone HCl PF (DILAUDID) injection 1 mg  1 mg IntraVENous Q3H PRN Haris Mccallum APRN - CNM   1 mg at 24 1014    ePHEDrine 50 MG/ML injection             oxytocin (PITOCIN) 30 units in 500 mL infusion  1-20 harpreet-units/min IntraVENous Continuous Ni Cooley MD        fentaNYL 2 mcg/mL BUPivacaine 0.125% in sodium chloride 0.9% 100 mL epidural infusion  1-15 mL/hr Epidural Continuous Manan Lowe MD        naloxone 0.4 mg in 10 mL sodium chloride syringe   IntraVENous PRN Manan Lowe MD        lactated ringers IV soln infusion   IntraVENous Continuous Haris Mccallum APRN -  mL/hr at 24 1259 Restarted at 24 1259    lactated ringers bolus

## 2024-06-04 NOTE — ANESTHESIA PROCEDURE NOTES
Epidural Block    Patient location during procedure: OB  Start time: 6/4/2024 1:23 PM  End time: 6/4/2024 1:34 PM  Reason for block: labor epidural  Staffing  Performed: anesthesiologist   Anesthesiologist: Manan Lowe MD  Performed by: Manan Lowe MD  Authorized by: Manan Lowe MD    Epidural  Patient position: sitting  Prep: DuraPrep  Patient monitoring: cardiac monitor, continuous pulse ox and frequent blood pressure checks  Approach: midline  Location: L3-4  Injection technique: MAGGY air  Provider prep: mask and sterile gloves  Needle  Needle type: Tuohy   Needle gauge: 17 G  Needle length: 3.5 in  Needle insertion depth: 5 cm  Catheter type: end hole  Catheter size: 18 G  Catheter at skin depth: 9 cmCatheter Secured: tegaderm and tape  Assessment  Sensory level: T10  Events: None  Hemodynamics: stable  Attempts: 1  Outcomes: uncomplicated and patient tolerated procedure well  Preanesthetic Checklist  Completed: patient identified, IV checked, site marked, risks and benefits discussed, surgical/procedural consents, equipment checked, pre-op evaluation, timeout performed, anesthesia consent given, oxygen available, monitors applied/VS acknowledged and fire risk safety assessment completed and verbalized

## 2024-06-05 ENCOUNTER — APPOINTMENT (OUTPATIENT)
Facility: HOSPITAL | Age: 34
End: 2024-06-05
Payer: COMMERCIAL

## 2024-06-05 PROCEDURE — 3700000156 HC EPIDURAL ANESTHESIA

## 2024-06-05 PROCEDURE — 7100000000 HC PACU RECOVERY - FIRST 15 MIN

## 2024-06-05 PROCEDURE — 6370000000 HC RX 637 (ALT 250 FOR IP): Performed by: OBSTETRICS & GYNECOLOGY

## 2024-06-05 PROCEDURE — 7210000100 HC LABOR FEE PER 1 HR

## 2024-06-05 PROCEDURE — 1120000000 HC RM PRIVATE OB

## 2024-06-05 PROCEDURE — 74176 CT ABD & PELVIS W/O CONTRAST: CPT

## 2024-06-05 PROCEDURE — 0UQMXZZ REPAIR VULVA, EXTERNAL APPROACH: ICD-10-PCS | Performed by: OBSTETRICS & GYNECOLOGY

## 2024-06-05 PROCEDURE — 51701 INSERT BLADDER CATHETER: CPT

## 2024-06-05 PROCEDURE — 2500000003 HC RX 250 WO HCPCS: Performed by: ANESTHESIOLOGY

## 2024-06-05 PROCEDURE — 59400 OBSTETRICAL CARE: CPT | Performed by: OBSTETRICS & GYNECOLOGY

## 2024-06-05 PROCEDURE — 7220000101 HC DELIVERY VAGINAL/SINGLE

## 2024-06-05 PROCEDURE — 6360000002 HC RX W HCPCS: Performed by: OBSTETRICS & GYNECOLOGY

## 2024-06-05 PROCEDURE — 7100000001 HC PACU RECOVERY - ADDTL 15 MIN

## 2024-06-05 RX ORDER — SODIUM CHLORIDE 9 MG/ML
INJECTION, SOLUTION INTRAVENOUS PRN
Status: DISCONTINUED | OUTPATIENT
Start: 2024-06-05 | End: 2024-06-07 | Stop reason: HOSPADM

## 2024-06-05 RX ORDER — IBUPROFEN 400 MG/1
800 TABLET ORAL EVERY 8 HOURS SCHEDULED
Status: DISCONTINUED | OUTPATIENT
Start: 2024-06-05 | End: 2024-06-07 | Stop reason: HOSPADM

## 2024-06-05 RX ORDER — ACETAMINOPHEN 500 MG
1000 TABLET ORAL EVERY 8 HOURS SCHEDULED
Status: DISCONTINUED | OUTPATIENT
Start: 2024-06-05 | End: 2024-06-07 | Stop reason: HOSPADM

## 2024-06-05 RX ORDER — MISOPROSTOL 200 UG/1
400 TABLET ORAL PRN
Status: DISCONTINUED | OUTPATIENT
Start: 2024-06-05 | End: 2024-06-07 | Stop reason: HOSPADM

## 2024-06-05 RX ORDER — DIPHENHYDRAMINE HYDROCHLORIDE 50 MG/ML
25 INJECTION INTRAMUSCULAR; INTRAVENOUS ONCE
Status: COMPLETED | OUTPATIENT
Start: 2024-06-05 | End: 2024-06-05

## 2024-06-05 RX ORDER — SODIUM CHLORIDE 0.9 % (FLUSH) 0.9 %
5-40 SYRINGE (ML) INJECTION EVERY 12 HOURS SCHEDULED
Status: DISCONTINUED | OUTPATIENT
Start: 2024-06-05 | End: 2024-06-07 | Stop reason: HOSPADM

## 2024-06-05 RX ORDER — OXYCODONE HYDROCHLORIDE 5 MG/1
5 TABLET ORAL EVERY 4 HOURS PRN
Status: DISCONTINUED | OUTPATIENT
Start: 2024-06-05 | End: 2024-06-07 | Stop reason: HOSPADM

## 2024-06-05 RX ORDER — SODIUM CHLORIDE 0.9 % (FLUSH) 0.9 %
5-40 SYRINGE (ML) INJECTION PRN
Status: DISCONTINUED | OUTPATIENT
Start: 2024-06-05 | End: 2024-06-07 | Stop reason: HOSPADM

## 2024-06-05 RX ORDER — ONDANSETRON 4 MG/1
8 TABLET, ORALLY DISINTEGRATING ORAL EVERY 8 HOURS PRN
Status: DISCONTINUED | OUTPATIENT
Start: 2024-06-05 | End: 2024-06-07 | Stop reason: HOSPADM

## 2024-06-05 RX ORDER — MODIFIED LANOLIN
OINTMENT (GRAM) TOPICAL PRN
Status: DISCONTINUED | OUTPATIENT
Start: 2024-06-05 | End: 2024-06-07 | Stop reason: HOSPADM

## 2024-06-05 RX ORDER — DOCUSATE SODIUM 100 MG/1
100 CAPSULE, LIQUID FILLED ORAL 2 TIMES DAILY
Status: DISCONTINUED | OUTPATIENT
Start: 2024-06-05 | End: 2024-06-07 | Stop reason: HOSPADM

## 2024-06-05 RX ADMIN — DIPHENHYDRAMINE HYDROCHLORIDE 25 MG: 50 INJECTION INTRAMUSCULAR; INTRAVENOUS at 02:12

## 2024-06-05 RX ADMIN — DOCUSATE SODIUM 100 MG: 100 CAPSULE, LIQUID FILLED ORAL at 20:51

## 2024-06-05 RX ADMIN — IBUPROFEN 800 MG: 400 TABLET, FILM COATED ORAL at 08:33

## 2024-06-05 RX ADMIN — IBUPROFEN 800 MG: 400 TABLET, FILM COATED ORAL at 23:46

## 2024-06-05 RX ADMIN — Medication 166.7 ML: at 07:25

## 2024-06-05 RX ADMIN — Medication 10 ML/HR: at 05:14

## 2024-06-05 RX ADMIN — IBUPROFEN 800 MG: 400 TABLET, FILM COATED ORAL at 15:56

## 2024-06-05 ASSESSMENT — PAIN SCALES - GENERAL: PAINLEVEL_OUTOF10: 6

## 2024-06-05 NOTE — LACTATION NOTE
This note was copied from a baby's chart.  Infant born vaginally this morning to a  mom at 39 2/7 weeks gestation. Mom noted breast changes during the pregnancy and has easily expressed colostrum. Infant initially with uncoordinated suck, but with sweet ease, she quickly started sucking effectively. Deep latch obtained on both breasts with rhythmic sucking and swallows noted. Encouraged mom to gently massage breasts as she nurses to further stimulate transfer of colostrum.   Feeding Plan:  Mother will keep baby skin to skin as often as possible, feed on demand, 8-12x/day , respond to feeding cues, obtain latch, listen for audible swallowing, be aware of signs of oxytocin release/ cramping,thirst,sleepiness while breastfeeding, offer both breasts,and will not limit feedings.  Mother agrees to utilize breast massage while nursing to facilitate lactogenesis.

## 2024-06-05 NOTE — DISCHARGE INSTRUCTIONS
area where the placenta was forms a sab. You may notice an increase in bleeding or darkening in bleeding around day 7 to 14, as the uterus slowly returns to its non-pregnant size it pushes this scab out. You may also notice an increase in bleeding with overactivity; this may be a sign that you need more rest  Clots  It is not uncommon to pass some blood clots, especially if you have been sitting or lying down for long periods of time. This is normal. If you are passing frequent clots or clots larger than an egg, please call the office. This bleeding usually lasts two to four weeks. You may have light bleeding or continue to spot for up to six weeks.  Perineum (vagina and bottom)  Continue to use the lay-bottle/water bottle to clean your perineum, rinsing front to back with warm water until your bleeding stops. If you have an extensive laceration, we will review specific care with you prior to discharge.  Swelling  It is common for postpartum women to have swelling, especially in their legs and feet. This is the body's way of getting rid of some of the excess fluid accumulated in pregnancy. It can take up to two weeks for the swelling to resolve. Elevate your feet when sitting or lying down and make sure you drink a lot of fluids. This may seem odd, but it will help your body get rid of the excess fluid. You may also sweat and or have large volumes of urine, as your body works to get rid of this extra fluid. Call the office if you have pain in your leg when walking or it here is an area in one leg that is read and/or warm to the touch.  Nutrition  Good nutrition and adequate fluids are necessary for tissue repair, healing, breastfeeding, and general health. Please do not start dieting until after your postpartum checkup. It is not uncommon to hold onto weight initially after delivery. It can take almost a full year to return to your pre-pregnancy weight. If you are breastfeeding, continue to take your prenatal

## 2024-06-05 NOTE — CONSULTS
Thyroid Disease Mother     No Known Problems Paternal Grandfather     Colon Cancer Maternal Grandfather     Breast Cancer Maternal Grandmother        Review of Systems  ROS from attending provider note from Dr. Cooley reviewed and changes (other than per HPI) include : none.     Objective:     Vital signs in last 24 hours:  /76   Pulse 80   Temp 98.6 °F (37 °C) (Oral)   Resp 15   Ht 1.6 m (5' 3\")   Wt 69.9 kg (154 lb)   LMP 09/04/2023 (Exact Date)   SpO2 97%   Breastfeeding Unknown   BMI 27.28 kg/m²     Intake/Output last 3 shifts:  [unfilled]      Physical Exam  General: NAD, A&O,   HEENT: lids normal, no tracheal deviation, no lesions or deformities  Pulmonary: Normal work of breathing   Abdomen: soft, NTTP, nondistended, and now suprapubic fullness or tenderness  : Voiding, Mild left flank discomfort no CVA tenderness  Gait: not observed  Neuro: Appropriate, no focal neurological deficits  Mood/Affect: normal    Lab/Imaging Review:       Most Recent Labs:  Lab Results   Component Value Date/Time    WBC 14.1 06/03/2024 07:11 PM    HGB 11.0 06/03/2024 07:11 PM    HCT 32.7 06/03/2024 07:11 PM     06/03/2024 07:11 PM    MCV 91.9 06/03/2024 07:11 PM        Lab Results   Component Value Date/Time     06/03/2024 07:11 PM    K 3.8 06/03/2024 07:11 PM     06/03/2024 07:11 PM    CO2 22 06/03/2024 07:11 PM    BUN 9 06/03/2024 07:11 PM    GLOB 3.7 06/03/2024 07:11 PM    ALT 14 06/03/2024 07:11 PM        No results found for: \"PSA\", \"PSA2\", \"PSAR1\", \"PSA1\", \"PSA3\", \"OQY021141\", \"PSAEXT\"     COAGS:  @[id number](PTTP,APTT,PTP,INR,INRT)@    No results found for: \"HBA1C\", \"WEZ5FPNX\"     No results found for: \"CPK\", \"CKMB\", \"BNP\"       Urine/Blood Cultures:  Results       ** No results found for the last 336 hours. **               IMAGING:  Pending      Signed By: Leydi Lagunas, CHANDLER - NP  - June 5, 2024

## 2024-06-05 NOTE — L&D DELIVERY NOTE
Pt progressed to full dilation and pushed for just an hour to deliver a VFI (Poppy) in ALESSIA with compound of left hand.  No nuchal cord noted.  No difficulty with delivery of shoulders/body.  Baby was placed directly on mom's abdomen, short cord.  After a 2 minute delay, cord was doubly clamped and then cut by FOB.  Baby was then placed skin to skin.  Placenta delivered within about 5 minutes intact with a short, 3vc.  There was a nonhemostatic periurethral laceration repaired with 3.0 monocryl.  Hemostasis achieved.  Dc was left doing skin to skin with Poppy with Harjit at her bedside.   mL  Weight 3005 g  Apgars 9 & 9    Alsawaf, GIRL Dc [[id number]]      Labor Events     Labor: No   Steroids: None  Cervical Ripening Date/Time:  6/3/24 20:43:00   Cervical Ripening Type: Misoprostol  Antibiotics Received during Labor: No  Rupture Date/Time:  24 15:07:00   Rupture Type: SROM, Intact  Fluid Color: Clear  Fluid Odor: None  Fluid Volume: Moderate  Induction: Lambert Bulb (Balloon)  Augmentation: Oxytocin  Labor Complications: None              Anesthesia    Method: Epidural       Labor Event Times      Labor onset date/time:  24 12:45:00 EDT     Dilation complete date/time:  24 06:00:00     Start pushing date/time:  2024 06:05:00   Decision date/time (emergent ):            Delivery Details      Delivery Date: 24 Delivery Time: 07:14:00   Delivery Type: Vaginal, Spontaneous               Presentation    Presentation: Compound  Position: Left  _: Occiput  _: Anterior       Shoulder Dystocia    Shoulder Dystocia Present?: No       Assisted Delivery Details    Forceps Attempted?: No  Vacuum Extractor Attempted?: No                           Cord    Vessels: 3 Vessels  Complications: None  Delayed Cord Clamping?: Yes  Cord Blood Disposition: Lab  Gases Sent?: No              Placenta    Date/Time: 2024 07:25:00  Removal: Spontaneous  Appearance:

## 2024-06-05 NOTE — ANESTHESIA POSTPROCEDURE EVALUATION
Post-Anesthesia Evaluation and Assessment    Patient: Dc Eduardo MRN: [id number]  SSN: [id number]    YOB: 1990  Age: 33 y.o.  Sex: female      I have evaluated the patient and they are stable and ready for discharge from the PACU.     Cardiovascular Function/Vital Signs  Visit Vitals  /67   Pulse 96   Temp 98.9 °F (37.2 °C) (Oral)   Resp 18   Ht 1.6 m (5' 3\")   Wt 69.9 kg (154 lb)   SpO2 99%   BMI 27.28 kg/m²       Patient is status post * No anesthesia type entered * anesthesia for * No procedures listed *.    Nausea/Vomiting: None    Postoperative hydration reviewed and adequate.    Pain:      Managed    Neurological Status:       At baseline    Mental Status, Level of Consciousness: Alert and  oriented to person, place, and time    Pulmonary Status:       Adequate oxygenation and airway patent    Complications related to anesthesia: None    Post-anesthesia assessment completed. No concerns    Signed By: Manan Lowe MD     June 5, 2024            Department of Anesthesiology  Postprocedure Note    Patient: Dc Eduardo  MRN: [id number]  YOB: 1990  Date of evaluation: 6/5/2024    Procedure Summary       Date: 06/04/24 Room / Location:     Anesthesia Start: 1323 Anesthesia Stop: 06/05/24 0714    Procedure: Labor Analgesia Diagnosis:     Scheduled Providers:  Responsible Provider: Lewis Turner MD    Anesthesia Type: epidural ASA Status: 2            Anesthesia Type: No value filed.    Brenda Phase I:      Brenda Phase II:      Anesthesia Post Evaluation    No notable events documented.

## 2024-06-06 PROCEDURE — 6370000000 HC RX 637 (ALT 250 FOR IP): Performed by: NURSE PRACTITIONER

## 2024-06-06 PROCEDURE — 6370000000 HC RX 637 (ALT 250 FOR IP): Performed by: OBSTETRICS & GYNECOLOGY

## 2024-06-06 PROCEDURE — 1120000000 HC RM PRIVATE OB

## 2024-06-06 PROCEDURE — 0TP98DZ REMOVAL OF INTRALUMINAL DEVICE FROM URETER, VIA NATURAL OR ARTIFICIAL OPENING ENDOSCOPIC: ICD-10-PCS | Performed by: STUDENT IN AN ORGANIZED HEALTH CARE EDUCATION/TRAINING PROGRAM

## 2024-06-06 PROCEDURE — 99024 POSTOP FOLLOW-UP VISIT: CPT | Performed by: OBSTETRICS & GYNECOLOGY

## 2024-06-06 RX ORDER — PHENAZOPYRIDINE HYDROCHLORIDE 100 MG/1
200 TABLET, FILM COATED ORAL
Status: DISCONTINUED | OUTPATIENT
Start: 2024-06-06 | End: 2024-06-07 | Stop reason: HOSPADM

## 2024-06-06 RX ORDER — NITROFURANTOIN 25; 75 MG/1; MG/1
100 CAPSULE ORAL EVERY 12 HOURS SCHEDULED
Status: DISCONTINUED | OUTPATIENT
Start: 2024-06-06 | End: 2024-06-07 | Stop reason: HOSPADM

## 2024-06-06 RX ORDER — LIDOCAINE HYDROCHLORIDE 20 MG/ML
12 JELLY TOPICAL PRN
Status: DISCONTINUED | OUTPATIENT
Start: 2024-06-06 | End: 2024-06-07 | Stop reason: HOSPADM

## 2024-06-06 RX ADMIN — PHENAZOPYRIDINE HYDROCHLORIDE 200 MG: 100 TABLET ORAL at 16:19

## 2024-06-06 RX ADMIN — ACETAMINOPHEN 1000 MG: 500 TABLET ORAL at 16:19

## 2024-06-06 RX ADMIN — DOCUSATE SODIUM 100 MG: 100 CAPSULE, LIQUID FILLED ORAL at 20:55

## 2024-06-06 RX ADMIN — NITROFURANTOIN (MONOHYDRATE/MACROCRYSTALS) 100 MG: 75; 25 CAPSULE ORAL at 20:54

## 2024-06-06 RX ADMIN — IBUPROFEN 800 MG: 400 TABLET, FILM COATED ORAL at 06:59

## 2024-06-06 RX ADMIN — IBUPROFEN 800 MG: 400 TABLET, FILM COATED ORAL at 20:54

## 2024-06-06 RX ADMIN — DOCUSATE SODIUM 100 MG: 100 CAPSULE, LIQUID FILLED ORAL at 09:45

## 2024-06-06 ASSESSMENT — PAIN DESCRIPTION - LOCATION
LOCATION: BACK
LOCATION: ABDOMEN

## 2024-06-06 ASSESSMENT — PAIN - FUNCTIONAL ASSESSMENT: PAIN_FUNCTIONAL_ASSESSMENT: ACTIVITIES ARE NOT PREVENTED

## 2024-06-06 ASSESSMENT — PAIN SCALES - GENERAL
PAINLEVEL_OUTOF10: 1
PAINLEVEL_OUTOF10: 1
PAINLEVEL_OUTOF10: 3

## 2024-06-06 ASSESSMENT — PAIN DESCRIPTION - DESCRIPTORS
DESCRIPTORS: SORE
DESCRIPTORS: SORE

## 2024-06-06 ASSESSMENT — PAIN DESCRIPTION - ORIENTATION
ORIENTATION: LOWER
ORIENTATION: LOWER

## 2024-06-06 NOTE — PROCEDURES
PROCEDURE NOTE  Date: 6/6/2024   Name: Dc Eduardo  YOB: 1990    Procedures    After sterile prep and instillation of urethral lidocaine, flexible cystoscope was passed easily into the bladder. Mucosa appeared normal. Stent visualized at left UO, grasped, and removed intact. She tolerated the procedure well

## 2024-06-06 NOTE — LACTATION NOTE
This note was copied from a baby's chart.  Baby nursing well today,  deep latch obtained, mother is comfortable, baby feeding vigorously with rhythmic suck, swallow, breathe pattern, audible swallowing, and evident milk transfer, both breasts offered, baby is asleep following feeding.   Mother taught how to do laid back and side lying positions.

## 2024-06-07 VITALS
RESPIRATION RATE: 16 BRPM | BODY MASS INDEX: 27.29 KG/M2 | WEIGHT: 154 LBS | SYSTOLIC BLOOD PRESSURE: 113 MMHG | HEIGHT: 63 IN | TEMPERATURE: 98.1 F | HEART RATE: 83 BPM | OXYGEN SATURATION: 99 % | DIASTOLIC BLOOD PRESSURE: 74 MMHG

## 2024-06-07 PROCEDURE — 6370000000 HC RX 637 (ALT 250 FOR IP): Performed by: OBSTETRICS & GYNECOLOGY

## 2024-06-07 PROCEDURE — 99024 POSTOP FOLLOW-UP VISIT: CPT | Performed by: OBSTETRICS & GYNECOLOGY

## 2024-06-07 PROCEDURE — 6370000000 HC RX 637 (ALT 250 FOR IP): Performed by: NURSE PRACTITIONER

## 2024-06-07 RX ORDER — NITROFURANTOIN 25; 75 MG/1; MG/1
100 CAPSULE ORAL EVERY 12 HOURS SCHEDULED
Qty: 3 CAPSULE | Refills: 0 | Status: SHIPPED | OUTPATIENT
Start: 2024-06-07 | End: 2024-06-09

## 2024-06-07 RX ORDER — IBUPROFEN 800 MG/1
800 TABLET ORAL EVERY 8 HOURS PRN
Qty: 40 TABLET | Refills: 0 | Status: SHIPPED | OUTPATIENT
Start: 2024-06-07

## 2024-06-07 RX ORDER — PHENAZOPYRIDINE HYDROCHLORIDE 200 MG/1
200 TABLET, FILM COATED ORAL
Qty: 9 TABLET | Refills: 0 | Status: SHIPPED | OUTPATIENT
Start: 2024-06-07 | End: 2024-06-10

## 2024-06-07 RX ADMIN — NITROFURANTOIN (MONOHYDRATE/MACROCRYSTALS) 100 MG: 75; 25 CAPSULE ORAL at 10:25

## 2024-06-07 RX ADMIN — DOCUSATE SODIUM 100 MG: 100 CAPSULE, LIQUID FILLED ORAL at 08:13

## 2024-06-07 RX ADMIN — IBUPROFEN 800 MG: 400 TABLET, FILM COATED ORAL at 05:45

## 2024-06-07 NOTE — LACTATION NOTE
This note was copied from a baby's chart.  Per mom, baby nursing well today,  deep latch obtained, mother is comfortable, baby feeding vigorously with rhythmic suck, swallow, breathe pattern, audible swallowing, and evident milk transfer, both breasts offered, baby is asleep following feeding. Discussed engorgement/mastitis and its management/treatment.

## 2024-06-07 NOTE — DISCHARGE SUMMARY
Obstetrical Discharge Summary     Name: Dc Eduardo MRN: [id number]  SSN: [id number]    YOB: 1990  Age: 33 y.o.  Sex: female      Admit Date: 6/3/2024    Discharge Date: 2024     Admitting Physician: Ni Cooley MD     Attending Physician:  Ni Cooley MD     Admission Diagnoses: 39 weeks gestation of pregnancy [Z3A.39]    Discharge Diagnoses:   Information for the patient's :  Jayce, EMILI Irwin [[id number]]   @[phone]@      Additional Diagnoses:  No components found for: \"OBEXTABORH\", \"OBEXTABSCRN\", \"OBEXTRUBELLA\", \"OBEXTGRBS\"    Hospital Course: Normal hospital course following the delivery.  She underwent removal of a left ureteral stent on  by Urology team.     Disposition at Discharge: Home or self care    Discharged Condition: Stable    Patient Instructions:   Current Discharge Medication List        START taking these medications    Details   ibuprofen (ADVIL;MOTRIN) 800 MG tablet Take 1 tablet by mouth every 8 hours as needed for Pain  Qty: 40 tablet, Refills: 0           CONTINUE these medications which have NOT CHANGED    Details   omeprazole (PRILOSEC) 10 MG delayed release capsule Take 1 capsule by mouth daily      docusate sodium (COLACE, DULCOLAX) 100 MG CAPS Take 100 mg by mouth 2 times daily  Qty: 60 capsule, Refills: 1      Prenatal Vit-Fe Fumarate-FA (PRENATAL PO) Take 1 tablet by mouth daily           STOP taking these medications       esomeprazole Magnesium (NEXIUM) 20 MG PACK Comments:   Reason for Stopping:         ondansetron (ZOFRAN-ODT) 4 MG disintegrating tablet Comments:   Reason for Stopping:               Reference my discharge instructions.    No follow-ups on file.     Signed By:  Ni Cooley MD     2024

## 2024-06-07 NOTE — PROGRESS NOTES
Patient: Dc Eduardo MRN: [id number]  SSN: [id number]    YOB: 1990  Age: 33 y.o.  Sex: female        ADMITTED: 6/3/2024 to Ni Cooley MD by Ni Cooley MD for 39 weeks gestation of pregnancy [Z3A.39]  POD# * No surgery found *     Dc Eduardo is doing well denies any pain Pt seen this morning discussed stent removal   Pt counseled that this is an outpt procedure cannot guarantee it will be successful .    Vitals: Temp (24hrs), Av.1 °F (36.7 °C), Min:97.8 °F (36.6 °C), Max:98.3 °F (36.8 °C)    Blood pressure 105/66, pulse 82, temperature 98.3 °F (36.8 °C), temperature source Oral, resp. rate 16, height 1.6 m (5' 3\"), weight 69.9 kg (154 lb), last menstrual period 2023, SpO2 100 %, unknown if currently breastfeeding.    Intake and Output:   1901 -  0700  In: 1902.5 [I.V.:1902.5]  Out: 2175 [Urine:1600]  No intake/output data recorded.  YURI Output lats 24 hrs: No data found.   YURI Output last 8 hrs: No data found.  BM over last 24 hrs: No data found.    Exam:   Gen: NAD    Labs:  CBC:   Lab Results   Component Value Date/Time    WBC 14.1 2024 07:11 PM    HCT 32.7 2024 07:11 PM     2024 07:11 PM     BMP:   Lab Results   Component Value Date/Time     2024 07:11 PM    K 3.8 2024 07:11 PM     2024 07:11 PM    CO2 22 2024 07:11 PM    BUN 9 2024 07:11 PM     Cultures: N/A    Imaging: N/A  Assessment/Plan:     L ureteral stent - planned bedside cysto today for removal   Touched base with nursing regarding coordination w/ anesthesia for epidural dosing   Discussed using nitrous oxide instead as a feasible form of analgesia .   Plan for Dr Urena to perform around 2 pm today     1430 s/p bedside stent removal with Dr Urena . Discussed renal colic with pt   Will start pyridium for irritative voiding symptoms  Will give Macrobid empirically     Urology will sign off for now       Signed By: Leydi 
1820-Pt arrived to labor and delivery room 11 for induction of labor.  Pt had Left kidney stent placed in April.  Denies any other complications during pregnancy.  Reports appropriate fetal movement but does states she has been \"quieter\" these last few hours. Denies leaking of fluid or vaginal bleeding.   2000-Bedside and Verbal shift change report given to CLARA Campos RN  (oncoming nurse) by MARIANO Fournier RN  (offgoing nurse). Report included the following information Nurse Handoff Report.    
1930: Bedside and Verbal shift change report given to KARLA Hernandez RN (oncoming nurse) by KALYAN Anders (offgoing nurse). Report included the following information Nurse Handoff Report, Intake/Output, MAR, and Recent Results.      2000: straight cathed patient, 700 mL urine drained. Patient positioned to high fowlers    2028: patient vomiting, requesting zofran    2035: zofran given per order, repositioned to L lateral w/ peanut ball between knees    2100: turned to R lateral, peanut ball between ankles    2210: patient requesting to take a break from peanut ball and sit up. Positioned in semi-fowlers- explained that we would have to reposition if baby does not tolerate this position. Patient verbalized understanding     2315: Straight cath- 350mL urine drained    2330: SVE 7/90/0    2335: repositioned to L lateral runners w/ peanut ball under R Leg     2340: Violeta CAVAZOS at bedside for epidural redose    0057: turned from L lateral to R lateral runners position, peanut ball under L leg    0155:patient reporting pressure and requesting recheck. SVE by Dr Rowland: 8/90/0    0200: repositioned to L lateral exaggerated runners w/ peanut ball under R leg, bed in [facility]    0216: bed out of [facility]. Patient reports that pain resolved with pushing PCA button, only feels pressure w/ ctx     0250: patient straight cathed, 550mL urine drained. Repositioned to L lateral w/ peanut ball between lower legs    0322: side lying release L lateral    0332: side lying release R lateral     0342: R lateral position with pelvis in posterior tilt and legs pulled back (\"spinning babies flying cowgirl position\"). Peanut ball between knees    0400: repositioned to R lateral w/ peanut ball between knees. 500mL fluid bolus initiated for low BP and minimal variability     0430: 500mL fluid bolus complete. Repositioned to L lateral runners position, peanut ball under R leg     0600: SVE by Dr Rowland- 10/100/2. Straight cathed 450 mL urine    0615: RN at 
1955. Bedside and Verbal shift change report given to robb Campos rn (oncoming nurse) by joseluis Fournier rn (offgoing nurse). Report included the following information Nurse Handoff Report, Intake/Output, MAR, and Recent Results.      2036. CAMILLE Mccallum cnm at bedside to discuss plan of care. Sve and pt is FT, thick and posterior. Plan to give 25mcg cytotec overnight. Pt verbalizes understanding and questions answered.     2042. Pt given 25 mcg cytotec buccal.     2110. Pt requesting tylenol for stent discomfort.     2125. Pt given 1,000mg po tylenol.     2335. Bedside and Verbal shift change report given to cielo Sommer rn (oncoming nurse) by robb Campos rn (offgoing nurse). Report included the following information Nurse Handoff Report and Recent Results.    
2330 Received OB SBAR Report from CLARA Campos RN      
@ 0745 Bedside and Verbal shift change report given to ARTURO Anders RN (oncoming nurse) by RADHA Sommer RN (offgoing nurse). Report included the following information Nurse Handoff Report, Adult Overview, Intake/Output, MAR, Recent Results, Med Rec Status, and Event Log.     @ 0800 patient resting comfortably in bed; POC is to place a harris balloon this morning; patient agrees with POC    @ 0828 Dr. Cooley at bedside performing SVE (1/50/-3)    @ 0833 cook catheter placed; 60 mL/60 mL     @ 0915 patient resting    @ 1014 1 mg IV Dilaudid given (See MAR)     @ 1153 patient off monitor to shower before next dose of Cytotec    @ 1200 cook catheter removed with traction    @ 1217 call to Dr. Cooley; POC is to recheck cervix and then most likely start Pitocin     @ 1259 Dr. Cooley at bedside performing SVE (3/70/-2); POC is for patient to get her epidural and then augment labor if needed     @ 0829-6697 tracing maternal HR; in position for epidural     @ 4093-4478 anesthesia at bedside performing epidural     @ 1332 patient lying on left side     @ 1343 10 mg ephedrine given (see MAR)     @ 1356 patient turned on right side with peanut ball     @ 1444 Pitocin started (see MAR)    @ 1507 SROM, moderate amount of clear fluids     @ 1519 patient turned to left side with peanut ball    @ 1700 Dr. Cooley at bedside performing SVE (3/70/-2)    @ 1703 st cath 200 mL    @ 1711 side lying pelvic release (left side)    @ 1720 side lying pelvic release (right side)    @ 1735 exaggerated runner's lunge (right side) with peanut ball     @ 1850 exaggerated runner's lunge (left side) with peanut ball     @ 1930 Bedside and Verbal shift change report given to ADÁN Hernandez RN (oncoming nurse) by ARTURO Anders RN (offgoing nurse). Report included the following information Nurse Handoff Report, Adult Overview, Intake/Output, MAR, Recent Results, Med Rec Status, and Event Log.             
@0730 Bedside shift change report given to RADHA Pandey RN (oncoming nurse) by [place] SKINNER (offgoing nurse). Report included the following information Nurse Handoff Report, Intake/Output, MAR, and Recent Results.      @0800 Per Dr. Cooley, epidural is to stay in place as patient may have imaging and her stents in her kidneys removed during her postpartum period. She will be placing a consult to urology.     @0940 TRANSFER - OUT REPORT:    Verbal report given to [place] SKINNER on Dc Eduardo  being transferred to [facility] for routine progression of patient care       Report consisted of patient's Situation, Background, Assessment and   Recommendations(SBAR).     Information from the following report(s) Nurse Handoff Report, Intake/Output, MAR, and Recent Results was reviewed with the receiving nurse.           Lines:   Peripheral IV 06/03/24 Left Forearm (Active)   Site Assessment Clean, dry & intact 06/04/24 0813   Line Status Flushed;Capped 06/04/24 0813   Line Care Ports disinfected 06/04/24 0813   Phlebitis Assessment No symptoms 06/04/24 0813   Infiltration Assessment 0 06/04/24 0813   Alcohol Cap Used Yes 06/04/24 0813   Dressing Status Clean, dry & intact 06/04/24 0813   Dressing Type Transparent 06/04/24 0813        Opportunity for questions and clarification was provided.      Patient transported with:  Registered Nurse       
CT reviewed no  calcifications noted along the stent .   Will plan for stent removal based on OR availability      CT: === 06/03/24 ===    CT ABDOMEN PELVIS WO IV CONTRAST    - Narrative -  EXAM: CT ABDOMEN PELVIS WO CONTRAST  ACC#: [id number]    INDICATION: L ureteral stent in place , r/o ureteral stone    COMPARISON: None.    IV CONTRAST: None.    ORAL CONTRAST: None.    TECHNIQUE:  Thin axial images were obtained through the abdomen and pelvis. Coronal and  sagittal reformats were generated. CT dose reduction was achieved through use of  a standardized protocol tailored for this examination and automatic exposure  control for dose modulation.    FINDINGS:  No acute abnormalities in the visualized lung bases.    No obvious masses are visualized in the liver.   No obvious masses in the  spleen.   The pancreas is grossly unremarkable.  No large calcified stones in  the gallbladder.  No significant biliary dilatation.    Bilateral adrenal glands appear normal.    There are no renal stones or hydronephrosis involving the right kidney. There  are no stones in the right ureter.  A 4 mm stone is visualized in the lower left kidney. There is no hydronephrosis.  A left ureteral stent is visualized with the proximal portion in the renal  pelvis and the distal portion in the bladder. There are no stones adjacent to  the ureteral stent.    No stones in the bladder.  No bladder wall thickening.    Large post gravid uterus is noted.    There is no free fluid, free air, or abscesses.    No significant lymphadenopathy.    No evidence of intestinal obstruction.    The abdominal aorta is normal in caliber.    No acute bony abnormalities.  No aggressive appearing bony lesions.  There are a  few bubbles of gas noted in the spinal canal which may be due to recent  epidural.    - Impression -  A left-sided ureteral stent appears appropriately positioned. There is no  hydronephrosis. There is a small stone in the left kidney. No 
Cervical Catheter insertion procedure note    Dc Eduardo is a ,  33 y.o. female who presents today far placement of a cervical catheter in the cervix for ripening. Her cervix is unfavorable and she is undergoing induction of labor.  She consents to have a cervical catheter placement today. The risks, benefits and assets of the procedure were discussed. Her questions were answered.     PROCEDURE: A Cook catheter was placed through the cervix via manual exam without difficulty. The uterine bulb was inflated with 60 cc of saline. The cervical balloon was inflated to 60 cc of saline.  Bleeding was minimal. The patient's level of discomfort was minimal.     POST PROCEDURE: The patient tolerated the procedure well. There were no complications.     She was given labor and ROM warnings.       Ni Cooley MD    
Dc seen earlier this morning after her vaginal delivery.  She is feeling well, happy with how her delivery went. Baby girl Poppy resting on her chest. Her , Harjit, is at the bedside.     Urology team will see her today for consideration of removal of her left ureteral stent, she was under the impression it could be removed after her delivery.   Will keep epidural catheter in place for now in case it can be used for procedure anesthesia.       Ni Cooley MD    
In to check on pt's pushing.  , variables with pushing, good recovery between  Temp 99.2  Pushing well with good fetal movement  Don't anticipate prolonged pushing.  
Labor Progress Note    Patient: Dc Eduardo YOB: 1990  Age: 33 y.o.     Subjective:     Dc felt painful cramping last night with the cytotec, received 2 doses of IV dilaudid.      Objective:     Vital Signs:  /72   Pulse 80   Temp 98.5 °F (36.9 °C) (Oral)   Resp 16   Ht 1.6 m (5' 3\")   Wt 69.9 kg (154 lb)   LMP 09/04/2023 (Exact Date)   SpO2 97%   BMI 27.28 kg/m²      Cervical Exam:    1/50/-3    FHR cat 1, reactive  [place] irreg ctx    Lab/Data Review:  Recent Results (from the past 24 hour(s))   CBC    Collection Time: 06/03/24  7:11 PM   Result Value Ref Range    WBC 14.1 (H) 3.6 - 11.0 K/uL    RBC 3.56 (L) 3.80 - 5.20 M/uL    Hemoglobin 11.0 (L) 11.5 - 16.0 g/dL    Hematocrit 32.7 (L) 35.0 - 47.0 %    MCV 91.9 80.0 - 99.0 FL    MCH 30.9 26.0 - 34.0 PG    MCHC 33.6 30.0 - 36.5 g/dL    RDW 13.1 11.5 - 14.5 %    Platelets 249 150 - 400 K/uL    MPV 11.1 8.9 - 12.9 FL    Nucleated RBCs 0.0 0  WBC    nRBC 0.00 0.00 - 0.01 K/uL   TYPE AND SCREEN    Collection Time: 06/03/24  7:11 PM   Result Value Ref Range    Crossmatch expiration date 06/06/2024,2359     ABO/Rh O POSITIVE     Antibody Screen NEG    Comprehensive metabolic panel    Collection Time: 06/03/24  7:11 PM   Result Value Ref Range    Sodium 137 136 - 145 mmol/L    Potassium 3.8 3.5 - 5.1 mmol/L    Chloride 108 97 - 108 mmol/L    CO2 22 21 - 32 mmol/L    Anion Gap 7 5 - 15 mmol/L    Glucose 97 65 - 100 mg/dL    BUN 9 6 - 20 MG/DL    Creatinine 0.70 0.55 - 1.02 MG/DL    BUN/Creatinine Ratio 13 12 - 20      Est, Glom Filt Rate >90 >60 ml/min/1.73m2    Calcium 9.2 8.5 - 10.1 MG/DL    Total Bilirubin 0.3 0.2 - 1.0 MG/DL    ALT 14 12 - 78 U/L    AST 16 15 - 37 U/L    Alk Phosphatase 114 45 - 117 U/L    Total Protein 6.2 (L) 6.4 - 8.2 g/dL    Albumin 2.5 (L) 3.5 - 5.0 g/dL    Globulin 3.7 2.0 - 4.0 g/dL    Albumin/Globulin Ratio 0.7 (L) 1.1 - 2.2         Assessment/Plan:     Principal Problem:    Encounter for induction of 
Labor Progress Note    Patient: Dc Eduardo YOB: 1990  Age: 33 y.o.     Subjective:     Dc's cook catheter spont expulsed around 1200. She is now feeling painful contractions and is requesting an epidural.     Objective:     Vital Signs:  /72   Pulse 97   Temp 97.8 °F (36.6 °C) (Oral)   Resp 16   Ht 1.6 m (5' 3\")   Wt 69.9 kg (154 lb)   LMP 09/04/2023 (Exact Date)   SpO2 97%   BMI 27.28 kg/m²      FHR cat 1, reactive  [place] ctx every 3 mins    Cervical Exam:    3/70/-2    Lab/Data Review:  Recent Results (from the past 24 hour(s))   CBC    Collection Time: 06/03/24  7:11 PM   Result Value Ref Range    WBC 14.1 (H) 3.6 - 11.0 K/uL    RBC 3.56 (L) 3.80 - 5.20 M/uL    Hemoglobin 11.0 (L) 11.5 - 16.0 g/dL    Hematocrit 32.7 (L) 35.0 - 47.0 %    MCV 91.9 80.0 - 99.0 FL    MCH 30.9 26.0 - 34.0 PG    MCHC 33.6 30.0 - 36.5 g/dL    RDW 13.1 11.5 - 14.5 %    Platelets 249 150 - 400 K/uL    MPV 11.1 8.9 - 12.9 FL    Nucleated RBCs 0.0 0  WBC    nRBC 0.00 0.00 - 0.01 K/uL   TYPE AND SCREEN    Collection Time: 06/03/24  7:11 PM   Result Value Ref Range    Crossmatch expiration date 06/06/2024,2352     ABO/Rh O POSITIVE     Antibody Screen NEG    Comprehensive metabolic panel    Collection Time: 06/03/24  7:11 PM   Result Value Ref Range    Sodium 137 136 - 145 mmol/L    Potassium 3.8 3.5 - 5.1 mmol/L    Chloride 108 97 - 108 mmol/L    CO2 22 21 - 32 mmol/L    Anion Gap 7 5 - 15 mmol/L    Glucose 97 65 - 100 mg/dL    BUN 9 6 - 20 MG/DL    Creatinine 0.70 0.55 - 1.02 MG/DL    BUN/Creatinine Ratio 13 12 - 20      Est, Glom Filt Rate >90 >60 ml/min/1.73m2    Calcium 9.2 8.5 - 10.1 MG/DL    Total Bilirubin 0.3 0.2 - 1.0 MG/DL    ALT 14 12 - 78 U/L    AST 16 15 - 37 U/L    Alk Phosphatase 114 45 - 117 U/L    Total Protein 6.2 (L) 6.4 - 8.2 g/dL    Albumin 2.5 (L) 3.5 - 5.0 g/dL    Globulin 3.7 2.0 - 4.0 g/dL    Albumin/Globulin Ratio 0.7 (L) 1.1 - 2.2         Assessment/Plan:     Principal 
Labor Progress Note  Patient seen, fetal heart rate and contraction pattern evaluated, patient examined.  /74   Pulse 91   Temp 99.4 °F (37.4 °C) (Oral)   Resp 14   Ht 1.6 m (5' 3\")   Wt 69.9 kg (154 lb)   LMP 09/04/2023 (Exact Date)   SpO2 96%   BMI 27.28 kg/m²     Physical Exam:  Cervical Exam:  Deferred  Membranes:  Intact  Uterine Activity: None  Fetal Heart Rate: Baseline: 140 per minute  Variability: moderate  Accelerations: yes  Decelerations: none    Assessment/Plan:  Reassuring fetal status, Continue plan for vaginal delivery.  CE deferred.  Plan for 25mcg buccal cytotec q4 hours.  Will reassess in am for Cook's placement.  Cat I tracing.  Pt may come off monitor 2 hours after dose.       Haris Mccallum, CHANDLER - ROSSM                             
Post-Partum Day Number 2 Progress Note    Dc Eduardo     Assessment: Doing well, post partum day 2    Plan:   1. Discharge home today  2. Follow up in office in 6 weeks with Ni Cooley MD  3. Post partum activity advised, diet as tolerated  4. Discharge Medications: ibuprofen and medications prior to admission    Information for the patient's :  Jayce, GIRL Dc [[id number]]   Vaginal, Spontaneous      S: Patient doing well without significant complaint.  Voiding without difficulty, normal lochia. Left ureteral stent removed by Urology yesterday.     Vitals:  /69   Pulse 78   Temp 98.1 °F (36.7 °C)   Resp 16   Ht 1.6 m (5' 3\")   Wt 69.9 kg (154 lb)   LMP 2023 (Exact Date)   SpO2 98%   Breastfeeding Unknown   BMI 27.28 kg/m²   Temp (24hrs), Av.9 °F (36.6 °C), Min:97.5 °F (36.4 °C), Max:98.3 °F (36.8 °C)      Exam:         Patient without distress.                 Abdomen soft, fundus firm, nontender                 Lower extremities are negative for swelling, cords or tenderness.    Labs:     Lab Results   Component Value Date/Time    WBC 14.1 2024 07:11 PM    WBC 14.4 2024 12:00 AM    WBC 11.1 2024 04:49 AM    WBC 15.9 2024 01:30 PM    WBC 11.7 2024 06:22 AM    WBC 12.4 2024 03:04 AM    WBC 11.5 2024 12:00 AM    WBC 11.4 2024 12:00 AM    WBC 8.8 2023 10:51 AM    WBC 8.8 2023 12:00 AM    HGB 11.0 2024 07:11 PM    HGB 11.2 2024 12:00 AM    HGB 9.9 2024 04:49 AM    HGB 12.0 2024 01:30 PM    HGB 10.5 2024 06:22 AM    HGB 11.2 2024 03:04 AM    HGB 11.8 2024 12:00 AM    HGB 11.1 2024 12:00 AM    HGB 11.2 2023 10:51 AM    HGB 12.7 2023 12:00 AM    HCT 32.7 2024 07:11 PM    HCT 33.8 2024 12:00 AM    HCT 29.9 2024 04:49 AM    HCT 34.5 2024 01:30 PM    HCT 30.5 2024 06:22 AM    HCT 33.7 2024 03:04 AM    HCT 35.5 2024 12:00 AM 
Postpartum Progress Note    Subjective: Pt doing well. No acute events overnight. Pain controlled. Lochia less than menses. Tolerating diet, ambulating and voiding without difficulty. Scant edema. Breastfeeding. No other concerns.    Objective:  Patient Vitals for the past 24 hrs:   BP Temp Temp src Pulse Resp SpO2   06/06/24 0432 96/62 98.2 °F (36.8 °C) Oral 77 16 95 %   06/05/24 2100 101/62 97.8 °F (36.6 °C) Oral 83 16 98 %   06/05/24 1540 100/64 98 °F (36.7 °C) Oral 73 16 97 %   06/05/24 0950 109/76 98.6 °F (37 °C) Oral 80 15 97 %   06/05/24 0853 124/72 -- -- 94 -- --   06/05/24 0838 107/70 -- -- 85 -- --       Physical Exam:  Gen-A&O, NAD, resting comfortably   CV-regular rate  Resp-non-labored  Abd-nt, nd, fundus firm below the umbilicus  -scant blood on pad  Ext-trace edema    Last 3 CMP:   Recent Labs     06/03/24 1911      K 3.8      CO2 22   BUN 9   CREATININE 0.70   GLUCOSE 97   CALCIUM 9.2   BILITOT 0.3   ALKPHOS 114   AST 16   ALT 14      Last 3 CBC:   Recent Labs     06/03/24 1911   WBC 14.1*   RBC 3.56*   HGB 11.0*   HCT 32.7*   MCV 91.9   MCH 30.9   MCHC 33.6   RDW 13.1      MPV 11.1        Result Date: 6/5/2024  EXAM: CT ABDOMEN PELVIS WO CONTRAST ACC#: [id number]  INDICATION: L ureteral stent in place , r/o ureteral stone  COMPARISON: None. IV CONTRAST: None. ORAL CONTRAST: None. TECHNIQUE: Thin axial images were obtained through the abdomen and pelvis. Coronal and sagittal reformats were generated. CT dose reduction was achieved through use of a standardized protocol tailored for this examination and automatic exposure control for dose modulation. FINDINGS: No acute abnormalities in the visualized lung bases. No obvious masses are visualized in the liver.   No obvious masses in the spleen.   The pancreas is grossly unremarkable.  No large calcified stones in the gallbladder.  No significant biliary dilatation. Bilateral adrenal glands appear normal.  There are no renal 
Pt feeling more constant rectal pressure and is also having a hot spot.    FHT baseline 150, mod variability, accels present, occ short lived variables  TOCO q 3-4 minutes  Pit @ 7  Cx 6-7/90/0    Pt has now gotten an epidural redose  Cont to titrate pit  
Pt has been able to get some good sleep after the most recent redose.    , mod variability, still with occ variables  TOCO q 3 min  Pit @ 8  Cx 10/100/+2    Pt will begin pushing    
Pt starting to feel her hot spot & pressure again.    FHT baseline 150, mod variability, accels present, occ variables  TOCO q 3 minutes  Pit @ 8  Cx 8/90/0, some swelling from 10-1    Pt to hit her epidural button  RN to change positions, just turned and placed in t crockett  Will try benadryl  
Received sign out from Dr. Cooley around 1730.    Dc is well known to me from her [facility] visits.  She is undergoing an elective IOL.  Pregnancy has been complicated by left renal stone with stent placed by urology with significant improved pain but with some discomfort and continued hematuria.    She is s/p buccal cytotec and CRB.    She is now on pitocin and SROM'd at 1500.  /mod variability/accels present/decels absent  TOCO q 2-5 minutes  Pit @ 4    Pt is comfortable with epidural.  Will continue with pitocin titration and CEFM.  Labor progressing well.  
Writer informed patient about NPO order, pt stated that per the physician they are not required to be NPO since procedure will be done at the bedside versus the OR and will be utilizing the epidural she still has in place.     Reports no pain or complaints at this time  
       Assessment/Plan:     Principal Problem:    Encounter for induction of labor  Active Problems:    39 weeks gestation of pregnancy  Resolved Problems:    * No resolved hospital problems. *    IOL for left renal/ureteral stone with stent placement in pregnancy by Urology and continued hematuria and flank pain.  - progressing well. S/p 4 doses of buccal cytotec and cook cath this morning (expulsed after 4 hours). SROM 1500.  - cont pitocin, reduce rate to 2 mu/min due to frequent ctx and possible subtle decelerations, position changes  - Urology consulted, they will see Dc tomorrow morning    Will sign out overnight care to Dr. Rowland with [facility] per pt request.        Signed By: Ni Cooley MD     June 4, 2024

## 2024-07-08 ENCOUNTER — POSTPARTUM VISIT (OUTPATIENT)
Age: 34
End: 2024-07-08

## 2024-07-08 VITALS — WEIGHT: 147 LBS | BODY MASS INDEX: 26.04 KG/M2

## 2024-07-08 PROBLEM — Z3A.39 39 WEEKS GESTATION OF PREGNANCY: Status: RESOLVED | Noted: 2024-06-03 | Resolved: 2024-07-08

## 2024-07-08 PROBLEM — Z3A.31 31 WEEKS GESTATION OF PREGNANCY: Status: RESOLVED | Noted: 2024-04-09 | Resolved: 2024-07-08

## 2024-07-08 PROBLEM — Z34.90 ENCOUNTER FOR INDUCTION OF LABOR: Status: RESOLVED | Noted: 2024-06-03 | Resolved: 2024-07-08

## 2024-07-08 PROBLEM — Z34.90 PREGNANCY: Status: RESOLVED | Noted: 2023-11-07 | Resolved: 2024-07-08

## 2024-07-08 PROCEDURE — 0503F POSTPARTUM CARE VISIT: CPT | Performed by: OBSTETRICS & GYNECOLOGY

## 2024-07-08 RX ORDER — DROSPIRENONE 4 MG/1
1 TABLET, FILM COATED ORAL DAILY
Qty: 84 TABLET | Refills: 4 | Status: SHIPPED | OUTPATIENT
Start: 2024-07-08

## 2024-07-08 NOTE — PROGRESS NOTES
Chief Complaint   Patient presents with    Postpartum Care       Dc Eduardo is a 33 y.o. female returns for a routine post-partum follow-up visit. She delivered on 2024 by  to Angelica.    Desires OCP safe with breastfeeding       Postpartum Depression: Medium Risk (2024)    [place]  Depression Scale     Last EPDS Total Score: 6     Last EPDS Self Harm Result: Never     EPDS 2024: 1    Breastfeeding: yes  Bleeding Resolved: none  Reviewed birth control options: desires OCP  Mood: good    Last Pap: 2022 NIL, HPV negative      1. Have you been to the ER, urgent care clinic, or hospitalized since your last visit? Delivered a baby    2. Have you seen or consulted any other health care providers outside of the [facility] since your last visit? No    She declines  a chaperone during the gynecologic exam today.      Ingrid Payne LPN

## 2024-07-08 NOTE — PROGRESS NOTES
Postpartum Visit    Dc Eduardo is a 33 y.o.  presenting for her postpartum visit.      She delivered via 24  and delivery was uncomplicated.    She has been doing well since discharged from the hospital with a normal postpartum course.  Baby girl Angelica is feeding well and sleeping 4-6 hours now!    Has not had any issues with kidney or bladder pain since hospital discharge, no hematuria.  Tried to schedule follow-up with [facility] but was not able to get through their phone lines.       Bleeding - resolved, discharge now  Perineal/Incisional pain - none  Mood - good. EDS score 1  Feeding - breast going well  Contraception - unsure, possibly OCPs        Past Medical History:   Diagnosis Date    Kidney stone        Past Surgical History:   Procedure Laterality Date    CARPAL TUNNEL RELEASE Right     CYSTOSCOPY N/A 2024    CYSTOSCOPY URETERAL STENT INSERTION/  performed by James Rubio MD at [facility] MAIN OR    TONSILLECTOMY  2014    WISDOM TOOTH EXTRACTION Bilateral 2008       Family History   Problem Relation Age of Onset    Thyroid Disease Mother     No Known Problems Paternal Grandfather     Colon Cancer Maternal Grandfather     Breast Cancer Maternal Grandmother        Social History     Socioeconomic History    Marital status:      Spouse name: Not on file    Number of children: Not on file    Years of education: Not on file    Highest education level: Not on file   Occupational History    Not on file   Tobacco Use    Smoking status: Never    Smokeless tobacco: Never   Vaping Use    Vaping Use: Never used   Substance and Sexual Activity    Alcohol use: Not Currently    Drug use: Never    Sexual activity: Yes     Partners: Male     Birth control/protection: Pill     Comment: 17 weeks pregnant   Other Topics Concern    Not on file   Social History Narrative    Not on file     Social Determinants of Health     Financial Resource Strain: Low Risk  (2024)    Overall Financial Resource

## 2024-09-23 DIAGNOSIS — R32 URINARY INCONTINENCE, UNSPECIFIED TYPE: Primary | ICD-10-CM

## (undated) DEVICE — Z INACTIVATING USE 2488003 GUIDEWIRE ENDO L150CM DIA0.035IN STR TIP (QTY = EACH)

## (undated) DEVICE — GLOVE ORANGE PI 7 1/2   MSG9075

## (undated) DEVICE — CYSTO/BLADDER IRRIGATION SET, REGULATING CLAMP

## (undated) DEVICE — CYSTO-SMH: Brand: MEDLINE INDUSTRIES, INC.

## (undated) DEVICE — SOLUTION IRRIGATION STRL H2O 1000 ML UROMATIC CONTAINER

## (undated) DEVICE — OPEN-END URETERAL CATHETER: Brand: COOK

## (undated) DEVICE — GARMENT,MEDLINE,DVT,INT,CALF,MED, GEN2: Brand: MEDLINE